# Patient Record
Sex: FEMALE | Race: WHITE | Employment: STUDENT | URBAN - METROPOLITAN AREA
[De-identification: names, ages, dates, MRNs, and addresses within clinical notes are randomized per-mention and may not be internally consistent; named-entity substitution may affect disease eponyms.]

---

## 2023-09-18 ENCOUNTER — HOSPITAL ENCOUNTER (OUTPATIENT)
Age: 23
Discharge: HOME | End: 2023-09-18
Payer: COMMERCIAL

## 2023-09-18 ENCOUNTER — OFFICE VISIT (OUTPATIENT)
Dept: ENT CLINIC | Age: 23
End: 2023-09-18
Payer: COMMERCIAL

## 2023-09-18 VITALS
SYSTOLIC BLOOD PRESSURE: 114 MMHG | WEIGHT: 100 LBS | OXYGEN SATURATION: 98 % | DIASTOLIC BLOOD PRESSURE: 74 MMHG | HEART RATE: 96 BPM | TEMPERATURE: 97.6 F

## 2023-09-18 DIAGNOSIS — J30.2 SEASONAL ALLERGIC RHINITIS, UNSPECIFIED TRIGGER: Primary | Chronic | ICD-10-CM

## 2023-09-18 DIAGNOSIS — J30.2 SEASONAL ALLERGIC RHINITIS, UNSPECIFIED TRIGGER: ICD-10-CM

## 2023-09-18 DIAGNOSIS — R09.82 POST-NASAL DRAINAGE: ICD-10-CM

## 2023-09-18 DIAGNOSIS — J32.9 RECURRENT SINUSITIS: Chronic | ICD-10-CM

## 2023-09-18 PROCEDURE — 99203 OFFICE O/P NEW LOW 30 MIN: CPT | Performed by: OTOLARYNGOLOGY

## 2023-09-18 PROCEDURE — 86003 ALLG SPEC IGE CRUDE XTRC EA: CPT

## 2023-09-18 PROCEDURE — 82785 ASSAY OF IGE: CPT

## 2023-09-18 RX ORDER — LORATADINE 10 MG/1
10 TABLET ORAL DAILY
COMMUNITY

## 2023-09-18 RX ORDER — TRAZODONE HYDROCHLORIDE 50 MG/1
25 TABLET ORAL NIGHTLY PRN
COMMUNITY
Start: 2023-09-12

## 2023-09-18 RX ORDER — OMEPRAZOLE 40 MG/1
40 CAPSULE, DELAYED RELEASE ORAL DAILY
COMMUNITY
Start: 2022-12-19

## 2023-09-18 RX ORDER — DULOXETIN HYDROCHLORIDE 60 MG/1
CAPSULE, DELAYED RELEASE ORAL
COMMUNITY
Start: 2023-09-04

## 2023-09-18 RX ORDER — NORETHINDRONE ACETATE AND ETHINYL ESTRADIOL AND FERROUS FUMARATE 1MG-20(24)
KIT ORAL
COMMUNITY
Start: 2021-06-14

## 2023-09-18 ASSESSMENT — ENCOUNTER SYMPTOMS
SORE THROAT: 0
RHINORRHEA: 0
SINUS PAIN: 0

## 2023-09-23 LAB
A ALTERNATA IGE QN: <0.1 KU/L (ref 0–0.34)
A FUMIGATUS IGE QN: <0.1 KU/L (ref 0–0.34)
AMER SYCAMORE IGE QN: <0.1 KU/L (ref 0–0.34)
BERMUDA GRASS IGE QN: 4.41 KU/L (ref 0–0.34)
BOXELDER IGE QN: 0.44 KU/L (ref 0–0.34)
C SPHAEROSPERMUM IGE QN: <0.1 KUL/L (ref 0–0.34)
CALIF WALNUT IGE QN: 5.61 KU/L (ref 0–0.34)
CAT DANDER IGE QN: 0.32 KU/L (ref 0–0.34)
CMN PIGWEED IGE QN: <0.1 KU/L (ref 0–0.34)
COMMON RAGWEED IGE QN: 0.15 KU/L (ref 0–0.34)
COTTONWOOD IGE QN: 0.11 KU/L (ref 0–0.34)
D FARINAE IGE QN: 12 KU/L (ref 0–0.34)
D PTERONYSS IGE QN: 8.87 KU/L (ref 0–0.34)
DOG DANDER IGE QN: 0.17 KU/L (ref 0–0.34)
IGE SERPL-ACNC: 210 IU/ML (ref 0–100)
M RACEMOSUS IGE QN: <0.1 KU/L (ref 0–0.34)
MOUSE EPITH IGE QN: <0.1 KU/L (ref 0–0.34)
P NOTATUM IGE QN: <0.1 KU/L (ref 0–0.34)
PECAN/HICK TREE IGE QN: 8.09 KU/L (ref 0–0.34)
RED CEDAR IGE QN: <0.1 KU/L (ref 0–0.34)
ROACH IGE QN: <0.1 KU/L (ref 0–0.34)
SALTWORT IGE QN: <0.1 KU/L (ref 0–0.34)
SHEEP SORREL IGE QN: <0.1 KU/L (ref 0–0.34)
SILVER BIRCH IGE QN: 0.44 KU/L (ref 0–0.34)
TIMOTHY IGE QN: 14.7 KU/L (ref 0–0.34)
WHITE ASH IGE QN: 0.38 KU/L (ref 0–0.34)
WHITE ELM IGE QN: 0.35 KU/L (ref 0–0.34)
WHITE MULBERRY IGE QN: <0.1 KU/L (ref 0–0.34)
WHITE OAK IGE QN: 1.07 KU/L (ref 0–0.34)

## 2023-09-29 ENCOUNTER — TELEPHONE (OUTPATIENT)
Dept: ENT CLINIC | Age: 23
End: 2023-09-29

## 2023-09-29 NOTE — TELEPHONE ENCOUNTER
Please call patient and advise that allergy testing was positive to multiple allergens. Please schedule an appointment asap to discuss, follow up and determine allergy management.

## 2023-10-03 ENCOUNTER — OFFICE VISIT (OUTPATIENT)
Dept: ENT CLINIC | Age: 23
End: 2023-10-03

## 2023-10-03 ENCOUNTER — TELEPHONE (OUTPATIENT)
Dept: ENT CLINIC | Age: 23
End: 2023-10-03

## 2023-10-03 VITALS
BODY MASS INDEX: 19.35 KG/M2 | HEART RATE: 96 BPM | WEIGHT: 96 LBS | OXYGEN SATURATION: 98 % | SYSTOLIC BLOOD PRESSURE: 104 MMHG | DIASTOLIC BLOOD PRESSURE: 71 MMHG | TEMPERATURE: 97.6 F | HEIGHT: 59 IN

## 2023-10-03 DIAGNOSIS — J32.9 CHRONIC SINUSITIS, UNSPECIFIED LOCATION: Chronic | ICD-10-CM

## 2023-10-03 DIAGNOSIS — J01.90 ACUTE RHINOSINUSITIS: Primary | ICD-10-CM

## 2023-10-03 RX ORDER — CEFDINIR 300 MG/1
600 CAPSULE ORAL DAILY
Qty: 28 CAPSULE | Refills: 0 | Status: SHIPPED | OUTPATIENT
Start: 2023-10-03 | End: 2023-10-17

## 2023-10-03 NOTE — TELEPHONE ENCOUNTER
Patients mother is calling stating that patient feels miserable with sinus headache and drainage. Patients mother stated that Dr. Merline Gibson told her to call and let us know that she needs to be seen the same day or the next day. I informed her that I can not over book appointments and gave her the first available on 10/9/2023 at 9:20 AM. Patients mother stated that she will be seeing if she can get her in some where else sooner because the patient is miserable.

## 2023-10-03 NOTE — PROGRESS NOTES
Chief Complaint   Patient presents with    Sinusitis        HISTORY OF PRESENT ILLNESS    Rivera Mcgill is a 23 y.o. female who presented today for evaluation and management for sinus infection. \"Started sinus headache Thursday. Post nasal drainage started Friday and progressively got worse. Face hurts really bad, above and below the eyes. My whole body started and aching on Sunday. PND makes nausea. My ear hurt a little bit. nasal pain. Typical symptoms of past sinus infections. This is the 5 sinus infection this year. Treated by the pediatrician. Patient was here with her mother. REVIEW OF SYSTEMS  Constitutional:  Denied fever and chills. ENT/sinus:  Denied otorrhea, rhinorrhea, and sore throat. EXAMINATION    WDWN, NAD  Voice:  Normal.    (+) Ears:  TMs non-erythematous, dull, mildly retracted, with probable ARIANE. The EACs, mastoids and pinnae were normal.    Nose:  Normal with no lesions.  (+)  Sinuses:  Tender x 4   (+)  Throat, OC/ OP:  moderate erythema of the post oropharyngeal wall with no pus. Otherwise, normal with no lesions. Neck:  NT, No masses. Trachea midline. Nodes:  No lymphadenopathy. IMPRESSION / Arlen Perez / ORDERS:   Ethan Hoffmann was seen today for sinusitis. Diagnoses and all orders for this visit:    Acute rhinosinusitis  -     cefdinir (OMNICEF) 300 MG capsule; Take 2 capsules by mouth daily for 14 days Take both capsules together at the same time, once daily. RECOMMENDATIONS / PLAN:   See Patient Instructions on file for this visit, which were discussed with the patient. Discussed FESS.

## 2023-10-03 NOTE — PATIENT INSTRUCTIONS
Please read and follow these instructions. Please call the office and speak to the MA or LPN with any questions regarding these instructions. RHINOSINUSITIS CARE  You may use acetaminophen (eg. Tylenol) or Ibuprofen (eg. Advil) (over the counter medications) as needed for fever or pain. Take Probiotic while you are taking antibiotics, to prevent diarrhea, stomach upset, pseudomembranous colitis, and C. difficile diarrhea. This may be obtained at your pharmacy or health food store. Alternatively, you may eat one cup of yogurt with active or live cultures twice daily while taking the antibiotic. Continue for two to three days after completion of the antibiotic. Use a 12 hour decongestant spray, 0.05% oxymetazoline (e.g. Afrin, Duration, 4-Way). Spray each nostril twice three times a day for three days, then two times a day for 2 days, and then stop for two days and then repeat the cycle once. Take one Mucinex-D (red orange box) maximum strength 1200 mg tablet each morning and one Mucinex (blue box) maximum strength 1200 mg tablet each evening, about 12 hours later, daily for the next 14 days. Use a saline nasal/sinus irrigation system, e.g. NeilMed sinus rinse, twice daily to help to clear secretions and drainage. Use distilled water; DO NOT USE TAP WATER! This is because of the possibility of amoeba or other microorganisms in tap water, which can result in a fatal disease. Alternatively, you could use a blue bulb syringe and solution of 1/4 tsp of table salt in 8 ounces (one cup or 240 ml) of distilled water. Use fluticasone 2 sprays in each nostril once daily for the next 14 days and then as needed for allergies. It is important to take all your medications as prescribed. Please continue your antibiotics as prescribed. Please call the office if your condition worsens or if symptoms persist after treatment is completed.

## 2023-10-30 SDOH — HEALTH STABILITY: PHYSICAL HEALTH: ON AVERAGE, HOW MANY MINUTES DO YOU ENGAGE IN EXERCISE AT THIS LEVEL?: 30 MIN

## 2023-10-30 SDOH — HEALTH STABILITY: PHYSICAL HEALTH: ON AVERAGE, HOW MANY DAYS PER WEEK DO YOU ENGAGE IN MODERATE TO STRENUOUS EXERCISE (LIKE A BRISK WALK)?: 2 DAYS

## 2023-11-01 ENCOUNTER — OFFICE VISIT (OUTPATIENT)
Dept: INTERNAL MEDICINE CLINIC | Age: 23
End: 2023-11-01
Payer: COMMERCIAL

## 2023-11-01 VITALS
BODY MASS INDEX: 19.96 KG/M2 | SYSTOLIC BLOOD PRESSURE: 116 MMHG | WEIGHT: 98.8 LBS | OXYGEN SATURATION: 99 % | HEART RATE: 85 BPM | DIASTOLIC BLOOD PRESSURE: 60 MMHG

## 2023-11-01 DIAGNOSIS — J30.2 SEASONAL ALLERGIC RHINITIS, UNSPECIFIED TRIGGER: ICD-10-CM

## 2023-11-01 DIAGNOSIS — F32.A ANXIETY AND DEPRESSION: ICD-10-CM

## 2023-11-01 DIAGNOSIS — G47.00 INSOMNIA, UNSPECIFIED TYPE: ICD-10-CM

## 2023-11-01 DIAGNOSIS — G43.019 INTRACTABLE MIGRAINE WITHOUT AURA AND WITHOUT STATUS MIGRAINOSUS: Primary | ICD-10-CM

## 2023-11-01 DIAGNOSIS — R48.0 DYSLEXIA: ICD-10-CM

## 2023-11-01 DIAGNOSIS — Z23 NEED FOR INFLUENZA VACCINATION: ICD-10-CM

## 2023-11-01 DIAGNOSIS — F41.9 ANXIETY AND DEPRESSION: ICD-10-CM

## 2023-11-01 PROBLEM — R42 VERTIGO: Status: ACTIVE | Noted: 2018-08-31

## 2023-11-01 PROBLEM — M25.562 CHRONIC PAIN OF BOTH KNEES: Status: RESOLVED | Noted: 2018-12-20 | Resolved: 2023-11-01

## 2023-11-01 PROBLEM — M79.18 AMPLIFIED MUSCULOSKELETAL PAIN SYNDROME: Status: ACTIVE | Noted: 2021-08-12

## 2023-11-01 PROBLEM — H53.8 BLURRED VISION, BILATERAL: Status: ACTIVE | Noted: 2019-08-21

## 2023-11-01 PROBLEM — M25.561 CHRONIC PAIN OF BOTH KNEES: Status: ACTIVE | Noted: 2018-12-20

## 2023-11-01 PROBLEM — M79.672 LEFT FOOT PAIN: Status: ACTIVE | Noted: 2021-03-25

## 2023-11-01 PROBLEM — G89.29 CHRONIC PAIN OF BOTH KNEES: Status: ACTIVE | Noted: 2018-12-20

## 2023-11-01 PROBLEM — M79.672 LEFT FOOT PAIN: Status: RESOLVED | Noted: 2021-03-25 | Resolved: 2023-11-01

## 2023-11-01 PROBLEM — H53.8 BLURRED VISION, BILATERAL: Status: RESOLVED | Noted: 2019-08-21 | Resolved: 2023-11-01

## 2023-11-01 PROBLEM — G89.4 AMPLIFIED MUSCULOSKELETAL PAIN SYNDROME: Status: ACTIVE | Noted: 2021-08-12

## 2023-11-01 PROBLEM — M25.561 CHRONIC PAIN OF BOTH KNEES: Status: RESOLVED | Noted: 2018-12-20 | Resolved: 2023-11-01

## 2023-11-01 PROBLEM — G89.29 CHRONIC MIDLINE LOW BACK PAIN: Status: RESOLVED | Noted: 2021-05-19 | Resolved: 2023-11-01

## 2023-11-01 PROBLEM — M76.891 HAMSTRING TENDONITIS OF RIGHT THIGH: Status: RESOLVED | Noted: 2019-04-29 | Resolved: 2023-11-01

## 2023-11-01 PROBLEM — M79.18 AMPLIFIED MUSCULOSKELETAL PAIN SYNDROME: Status: RESOLVED | Noted: 2021-08-12 | Resolved: 2023-11-01

## 2023-11-01 PROBLEM — M25.562 CHRONIC PAIN OF BOTH KNEES: Status: ACTIVE | Noted: 2018-12-20

## 2023-11-01 PROBLEM — R63.4 WEIGHT LOSS: Status: ACTIVE | Noted: 2019-08-21

## 2023-11-01 PROBLEM — M54.50 CHRONIC MIDLINE LOW BACK PAIN: Status: ACTIVE | Noted: 2021-05-19

## 2023-11-01 PROBLEM — M54.50 CHRONIC MIDLINE LOW BACK PAIN: Status: RESOLVED | Noted: 2021-05-19 | Resolved: 2023-11-01

## 2023-11-01 PROBLEM — R42 VERTIGO: Status: RESOLVED | Noted: 2018-08-31 | Resolved: 2023-11-01

## 2023-11-01 PROBLEM — G89.29 CHRONIC MIDLINE LOW BACK PAIN: Status: ACTIVE | Noted: 2021-05-19

## 2023-11-01 PROBLEM — R63.4 WEIGHT LOSS: Status: RESOLVED | Noted: 2019-08-21 | Resolved: 2023-11-01

## 2023-11-01 PROBLEM — M76.891 HAMSTRING TENDONITIS OF RIGHT THIGH: Status: ACTIVE | Noted: 2019-04-29

## 2023-11-01 PROBLEM — G89.29 CHRONIC PAIN OF BOTH KNEES: Status: RESOLVED | Noted: 2018-12-20 | Resolved: 2023-11-01

## 2023-11-01 PROBLEM — G89.4 AMPLIFIED MUSCULOSKELETAL PAIN SYNDROME: Status: RESOLVED | Noted: 2021-08-12 | Resolved: 2023-11-01

## 2023-11-01 PROBLEM — J30.9 ALLERGIC RHINITIS: Status: ACTIVE | Noted: 2023-11-01

## 2023-11-01 PROCEDURE — 90674 CCIIV4 VAC NO PRSV 0.5 ML IM: CPT | Performed by: NURSE PRACTITIONER

## 2023-11-01 PROCEDURE — 90471 IMMUNIZATION ADMIN: CPT | Performed by: NURSE PRACTITIONER

## 2023-11-01 PROCEDURE — 99204 OFFICE O/P NEW MOD 45 MIN: CPT | Performed by: NURSE PRACTITIONER

## 2023-11-01 SDOH — ECONOMIC STABILITY: FOOD INSECURITY: WITHIN THE PAST 12 MONTHS, THE FOOD YOU BOUGHT JUST DIDN'T LAST AND YOU DIDN'T HAVE MONEY TO GET MORE.: NEVER TRUE

## 2023-11-01 SDOH — ECONOMIC STABILITY: FOOD INSECURITY: WITHIN THE PAST 12 MONTHS, YOU WORRIED THAT YOUR FOOD WOULD RUN OUT BEFORE YOU GOT MONEY TO BUY MORE.: NEVER TRUE

## 2023-11-01 SDOH — ECONOMIC STABILITY: INCOME INSECURITY: HOW HARD IS IT FOR YOU TO PAY FOR THE VERY BASICS LIKE FOOD, HOUSING, MEDICAL CARE, AND HEATING?: NOT HARD AT ALL

## 2023-11-01 SDOH — ECONOMIC STABILITY: HOUSING INSECURITY
IN THE LAST 12 MONTHS, WAS THERE A TIME WHEN YOU DID NOT HAVE A STEADY PLACE TO SLEEP OR SLEPT IN A SHELTER (INCLUDING NOW)?: NO

## 2023-11-01 ASSESSMENT — ENCOUNTER SYMPTOMS
SHORTNESS OF BREATH: 0
CHEST TIGHTNESS: 0
COUGH: 0
WHEEZING: 0

## 2023-11-01 ASSESSMENT — PATIENT HEALTH QUESTIONNAIRE - PHQ9
2. FEELING DOWN, DEPRESSED OR HOPELESS: 0
3. TROUBLE FALLING OR STAYING ASLEEP: 0
SUM OF ALL RESPONSES TO PHQ QUESTIONS 1-9: 0
6. FEELING BAD ABOUT YOURSELF - OR THAT YOU ARE A FAILURE OR HAVE LET YOURSELF OR YOUR FAMILY DOWN: 0
4. FEELING TIRED OR HAVING LITTLE ENERGY: 0
7. TROUBLE CONCENTRATING ON THINGS, SUCH AS READING THE NEWSPAPER OR WATCHING TELEVISION: 0
8. MOVING OR SPEAKING SO SLOWLY THAT OTHER PEOPLE COULD HAVE NOTICED. OR THE OPPOSITE, BEING SO FIGETY OR RESTLESS THAT YOU HAVE BEEN MOVING AROUND A LOT MORE THAN USUAL: 0
SUM OF ALL RESPONSES TO PHQ9 QUESTIONS 1 & 2: 0
SUM OF ALL RESPONSES TO PHQ QUESTIONS 1-9: 0
1. LITTLE INTEREST OR PLEASURE IN DOING THINGS: 0
5. POOR APPETITE OR OVEREATING: 0
9. THOUGHTS THAT YOU WOULD BE BETTER OFF DEAD, OR OF HURTING YOURSELF: 0
10. IF YOU CHECKED OFF ANY PROBLEMS, HOW DIFFICULT HAVE THESE PROBLEMS MADE IT FOR YOU TO DO YOUR WORK, TAKE CARE OF THINGS AT HOME, OR GET ALONG WITH OTHER PEOPLE: 0

## 2023-11-01 NOTE — ASSESSMENT & PLAN NOTE
Chronic, intermittent. Continue loratadine and flonase during trigger seasons. Continue seeing ENT as recommended.

## 2023-11-01 NOTE — ASSESSMENT & PLAN NOTE
Chronic, controlled. Continue trazodone 25 mg nightly prn. Continue seeing psychiatry and psychology.

## 2023-11-01 NOTE — PROGRESS NOTES
11/1/2023    This is a 23 y.o. female   Chief Complaint   Patient presents with    New Patient     Previous PCP was [facility] [place]. Dr. Jose Cervantes. No concerns      HPI     Here to establish care, previously seeing pediatrician. Seeing GYN, Dr. Letha Arceo- on OCP  History of anemia that is controlled. Had lab work last year, reviewed in care everywhere. Seeing psychiatry at [facility] and counseling at [facility]. Anxiety and depression - taking duloxetine 60 mg daily. Insomnia - doing well on trazodone 25 mg nightly as needed. 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 Niece is 4. Lives at home with mom/ dad. Graduated from  high school. In school at [address] but changing to PT. History of vertigo and blurred vision - resolved with PT   Migraines - takes prn ibuprofen and have been controlled. She sees Dr. Nick Prasad with ENT for recurrent sinusitis, currently controlled and doing well on antihistamine and flonase. EGD x2 - has not seen GI recently, denies any concerns and doing well on PPI. Adopted at age 5, family history is unknown. 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 Blurred vision, bilateral 8/21/2019    Chronic midline low back pain 5/19/2021    Chronic pain of both knees 12/20/2018    Depression 2019?     Fracture of great toe, left, closed 11/29/2013    Hamstring tendonitis of right thigh 4/29/2019    Left foot pain 3/25/2021    MTP instability 11/19/2013    Patellofemoral disorder 1/4/2013    Quadriceps tendonitis 1/4/2013    Vertigo 8/31/2018    Weight loss 8/21/2019       Past Surgical History:   Procedure Laterality Date    KNEE SURGERY Right 01/2020    UPPER GASTROINTESTINAL ENDOSCOPY  11/22/2022    UPPER GASTROINTESTINAL ENDOSCOPY

## 2023-11-04 ENCOUNTER — TELEPHONE (OUTPATIENT)
Dept: ENT CLINIC | Age: 23
End: 2023-11-04

## 2023-11-06 NOTE — TELEPHONE ENCOUNTER
Pt called back and would like to see when she can be scheduled for a allergy f/u and a sinus infection.

## 2023-11-08 ENCOUNTER — OFFICE VISIT (OUTPATIENT)
Dept: INTERNAL MEDICINE CLINIC | Age: 23
End: 2023-11-08
Payer: COMMERCIAL

## 2023-11-08 VITALS
SYSTOLIC BLOOD PRESSURE: 114 MMHG | HEART RATE: 90 BPM | WEIGHT: 98 LBS | DIASTOLIC BLOOD PRESSURE: 70 MMHG | OXYGEN SATURATION: 97 % | BODY MASS INDEX: 19.79 KG/M2

## 2023-11-08 DIAGNOSIS — R09.81 SINUS CONGESTION: ICD-10-CM

## 2023-11-08 DIAGNOSIS — R68.89 FLU-LIKE SYMPTOMS: Primary | ICD-10-CM

## 2023-11-08 DIAGNOSIS — J02.9 SORE THROAT: ICD-10-CM

## 2023-11-08 LAB
INFLUENZA A ANTIBODY: NORMAL
INFLUENZA B ANTIBODY: NORMAL
RSV AG NOSE QL: NEGATIVE
S PYO AG THROAT QL: NORMAL

## 2023-11-08 PROCEDURE — 99213 OFFICE O/P EST LOW 20 MIN: CPT | Performed by: NURSE PRACTITIONER

## 2023-11-08 PROCEDURE — 87804 INFLUENZA ASSAY W/OPTIC: CPT | Performed by: NURSE PRACTITIONER

## 2023-11-08 PROCEDURE — 87880 STREP A ASSAY W/OPTIC: CPT | Performed by: NURSE PRACTITIONER

## 2023-11-08 NOTE — ASSESSMENT & PLAN NOTE
Acute, uncontrolled. Flu and strep are negative on POCT. Checking RSV and COVID.  No signs of bacterial infection on exam.     Supportive care reviewed:  Humidified air  Honey lemon tea  Nasal rinse prn  Flonase daily  NSAIDs/ tylenol for pain and fever  Mucinex prn for congestion    transmission precautions reviewed   Check pulse ox and go to ED/ urgent care if drops below 92% and/ or uncontrolled worsening dyspnea

## 2023-11-08 NOTE — PROGRESS NOTES
11/8/23     Chief Complaint   Patient presents with    Congestion     Congestion, facial sinus pain, slight sore throat, raspy voice- started Sunday. Neg Covid test today. HPI    Here for an acute visit, reports a 3 day history of congestion, sinus drainage, sinus pressure, sore throat, hoarseness, fatigue. Denies fever, chills, body aches, cough, SOB. 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 Symptoms are unchanged other than hoarseness is new. She is using advil cold / sinus with some relief. Using her flonase daily. Last treated with abx last month with ENT. No Known Allergies    Current Outpatient Medications   Medication Sig Dispense Refill    loratadine (CLARITIN) 10 MG tablet Take 1 tablet by mouth daily      DULoxetine (CYMBALTA) 60 MG extended release capsule       Norethin Ace-Eth Estrad-FE 1-20 MG-MCG(24) CHEW CHEW AND SWALLOW 1 TABLET BY MOUTH EVERY DAY      traZODone (DESYREL) 50 MG tablet Take 0.5 tablets by mouth nightly as needed      omeprazole (PRILOSEC) 40 MG delayed release capsule Take 1 capsule by mouth daily       No current facility-administered medications for this visit. Review of Systems  Negative other than HPI    Vitals:    11/08/23 1308   BP: 114/70   Pulse: 90   SpO2: 97%   Weight: 44.5 kg (98 lb)      Physical Exam  Constitutional:       General: She is not in acute distress. Appearance: Normal appearance. HENT:      Head: Normocephalic and atraumatic. Right Ear: Ear canal normal. Tympanic membrane is scarred. Tympanic membrane is not perforated, erythematous or retracted. Left Ear: Ear canal normal. Tympanic membrane is scarred. Tympanic membrane is not perforated, erythematous or retracted. Ears:      Comments: Bilateral TM dull      Nose: No congestion. Right Sinus: No maxillary sinus tenderness or frontal sinus tenderness. Left Sinus: No maxillary sinus tenderness or frontal sinus tenderness.

## 2023-11-09 LAB — SARS-COV-2 RNA RESP QL NAA+PROBE: NOT DETECTED

## 2023-11-10 ENCOUNTER — OFFICE VISIT (OUTPATIENT)
Dept: INTERNAL MEDICINE CLINIC | Age: 23
End: 2023-11-10
Payer: COMMERCIAL

## 2023-11-10 VITALS — DIASTOLIC BLOOD PRESSURE: 60 MMHG | OXYGEN SATURATION: 98 % | HEART RATE: 62 BPM | SYSTOLIC BLOOD PRESSURE: 110 MMHG

## 2023-11-10 DIAGNOSIS — H65.03 NON-RECURRENT ACUTE SEROUS OTITIS MEDIA OF BOTH EARS: ICD-10-CM

## 2023-11-10 DIAGNOSIS — J40 BRONCHITIS: ICD-10-CM

## 2023-11-10 DIAGNOSIS — J06.9 ACUTE URI OF MULTIPLE SITES: Primary | ICD-10-CM

## 2023-11-10 PROBLEM — R68.89 FLU-LIKE SYMPTOMS: Status: RESOLVED | Noted: 2023-11-08 | Resolved: 2023-11-10

## 2023-11-10 PROCEDURE — 99213 OFFICE O/P EST LOW 20 MIN: CPT | Performed by: NURSE PRACTITIONER

## 2023-11-10 RX ORDER — AMOXICILLIN AND CLAVULANATE POTASSIUM 875; 125 MG/1; MG/1
1 TABLET, FILM COATED ORAL 2 TIMES DAILY
Qty: 14 TABLET | Refills: 0 | Status: SHIPPED | OUTPATIENT
Start: 2023-11-10 | End: 2023-11-17

## 2023-11-10 RX ORDER — METHYLPREDNISOLONE 4 MG/1
TABLET ORAL
Qty: 1 KIT | Refills: 0 | Status: SHIPPED | OUTPATIENT
Start: 2023-11-10

## 2023-11-10 RX ORDER — BENZONATATE 100 MG/1
100 CAPSULE ORAL 3 TIMES DAILY PRN
Qty: 30 CAPSULE | Refills: 0 | Status: SHIPPED | OUTPATIENT
Start: 2023-11-10 | End: 2023-11-20

## 2023-11-10 NOTE — PROGRESS NOTES
11/10/23     Chief Complaint   Patient presents with    Oral Swelling     Throat pain and feels swollen/tight       HPI    Here for an acute visit/ follow up. She was seen here 2 days ago for URI symptoms- she continues to have congestion, sinus drainage, sinus pressure, sore throat, hoarseness, fatigue. She had negative covid, flu, strep and rsv tests. She is using mucinex, flonase, hydrating and supportive care without relief. Today is she is feeling worse, complains of new SOB, tightness, sore throat, bilateral ear pain and low grade fevers the past 2 days. No Known Allergies    Current Outpatient Medications   Medication Sig Dispense Refill    amoxicillin-clavulanate (AUGMENTIN) 875-125 MG per tablet Take 1 tablet by mouth 2 times daily for 7 days 14 tablet 0    methylPREDNISolone (MEDROL DOSEPACK) 4 MG tablet Take by mouth. 1 kit 0    benzonatate (TESSALON) 100 MG capsule Take 1 capsule by mouth 3 times daily as needed for Cough 30 capsule 0    loratadine (CLARITIN) 10 MG tablet Take 1 tablet by mouth daily      DULoxetine (CYMBALTA) 60 MG extended release capsule       Norethin Ace-Eth Estrad-FE 1-20 MG-MCG(24) CHEW CHEW AND SWALLOW 1 TABLET BY MOUTH EVERY DAY      traZODone (DESYREL) 50 MG tablet Take 0.5 tablets by mouth nightly as needed      omeprazole (PRILOSEC) 40 MG delayed release capsule Take 1 capsule by mouth daily       No current facility-administered medications for this visit. Review of Systems  Negative other than HPI     Vitals:    11/10/23 1110   BP: 110/60   Pulse: 62   SpO2: 98%      Physical Exam  Constitutional:       General: She is not in acute distress. Appearance: Normal appearance. She is not ill-appearing. HENT:      Head: Normocephalic and atraumatic. Right Ear: A middle ear effusion is present. Tympanic membrane is scarred, erythematous and bulging. Tympanic membrane is not perforated. Left Ear: A middle ear effusion is present.  Tympanic membrane is

## 2023-11-10 NOTE — ASSESSMENT & PLAN NOTE
Acute, worsening. Covering with abx given worsening symptoms and new otitis media. Covering wheezing with steroids.       Supportive care reviewed  Humidified air  Honey lemon tea  Nasal rinse prn  Flonase daily  NSAIDs/ tylenol for pain and fever  Mucinex prn for congestion    transmission precautions reviewed   Check pulse ox and go to ED/ urgent care if drops below 92% and/ or uncontrolled worsening dyspnea

## 2023-12-15 ENCOUNTER — TELEMEDICINE (OUTPATIENT)
Dept: INTERNAL MEDICINE CLINIC | Age: 23
End: 2023-12-15
Payer: COMMERCIAL

## 2023-12-15 DIAGNOSIS — J06.9 ACUTE URI OF MULTIPLE SITES: Primary | ICD-10-CM

## 2023-12-15 LAB
INFLUENZA A ANTIBODY: NORMAL
INFLUENZA B ANTIBODY: NORMAL
S PYO AG THROAT QL: NORMAL

## 2023-12-15 PROCEDURE — 99214 OFFICE O/P EST MOD 30 MIN: CPT | Performed by: NURSE PRACTITIONER

## 2023-12-15 PROCEDURE — 87880 STREP A ASSAY W/OPTIC: CPT | Performed by: NURSE PRACTITIONER

## 2023-12-15 PROCEDURE — 87804 INFLUENZA ASSAY W/OPTIC: CPT | Performed by: NURSE PRACTITIONER

## 2023-12-15 RX ORDER — ALBUTEROL SULFATE 90 UG/1
2 AEROSOL, METERED RESPIRATORY (INHALATION) 4 TIMES DAILY PRN
Qty: 18 G | Refills: 0 | Status: SHIPPED | OUTPATIENT
Start: 2023-12-15

## 2023-12-15 NOTE — PROGRESS NOTES
12/15/2023    TELEHEALTH EVALUATION -- Audio/Visual (During JOPGI-69 public health emergency)    Chief Complaint   Patient presents with    Congestion     HPI:    Arcelia Wu (:  2000) has requested an audio/video evaluation for the following concern(s):    VV for URI symptoms, started 2-3 days ago. Having sinus pressure, sore throat, PND, congestion, mild cough, fatigue and bilateral ear pain. Denies fever, chills, body aches. Denies any known exposures to flu, strep, covid or rsv. She is using OTC cold/ sinus, with some relief. She is not using nasal sprays. She sees ENT, does not have appt scheduled. Reports negative home covid test today. Review of Systems  Negative other than HPI     Prior to Visit Medications    Medication Sig Taking?  Authorizing Provider   albuterol sulfate HFA (VENTOLIN HFA) 108 (90 Base) MCG/ACT inhaler Inhale 2 puffs into the lungs 4 times daily as needed for Wheezing Yes Doug Catherine, APRN - CNP   loratadine (CLARITIN) 10 MG tablet Take 1 tablet by mouth daily  ProviderDominick MD   DULoxetine (CYMBALTA) 60 MG extended release capsule   ProviderDominick MD   Norethin Ace-Eth Estrad-FE 1-20 MG-MCG(24) CHEW CHEW AND SWALLOW 1 [address]  ProviderDominick MD   traZODone (DESYREL) 50 MG tablet Take 0.5 tablets by mouth nightly as needed  ProviderDominick MD   omeprazole (PRILOSEC) 40 MG delayed release capsule Take 1 capsule by mouth daily  ProviderDominick MD     Social History     Tobacco Use    Smoking status: Never     Passive exposure: Never    Smokeless tobacco: Never   Vaping Use    Vaping Use: Never used   Substance Use Topics    Alcohol use: Never    Drug use: Never            PHYSICAL EXAMINATION:  [ INSTRUCTIONS:  \"[x]\" Indicates a positive item  \"[]\" Indicates a negative item  -- DELETE ALL ITEMS NOT EXAMINED]  Vital Signs: (As obtained by patient/caregiver or practitioner observation)        12/15/2023    12:56

## 2023-12-15 NOTE — ASSESSMENT & PLAN NOTE
Acute, uncontrolled. Recurrent sinusitis. Follow up with ENT. Come to office for covid, flu, rsv and strep testing. Sending over a Rx for albuterol to use if needed with history of wheezing.      Supportive care reviewed  Humidified air  Honey lemon tea  Nasal rinse prn  Flonase daily  NSAIDs/ tylenol for pain and fever  Mucinex prn for congestion    transmission precautions reviewed   Check pulse ox and go to ED/ urgent care if drops below 92% and/ or uncontrolled worsening dyspnea

## 2023-12-16 LAB — RSV AG NOSE QL: NEGATIVE

## 2023-12-17 LAB — SARS-COV-2 N GENE RESP QL NAA+PROBE: NOT DETECTED

## 2023-12-21 PROBLEM — J01.01 RECURRENT MAXILLARY SINUSITIS: Status: ACTIVE | Noted: 2023-12-21

## 2024-01-22 ENCOUNTER — OFFICE VISIT (OUTPATIENT)
Dept: ENT CLINIC | Age: 24
End: 2024-01-22

## 2024-01-22 VITALS
HEART RATE: 80 BPM | HEIGHT: 59 IN | TEMPERATURE: 97.8 F | WEIGHT: 100 LBS | SYSTOLIC BLOOD PRESSURE: 105 MMHG | BODY MASS INDEX: 20.16 KG/M2 | OXYGEN SATURATION: 99 % | DIASTOLIC BLOOD PRESSURE: 72 MMHG

## 2024-01-22 DIAGNOSIS — J01.90 ACUTE RHINOSINUSITIS: Primary | ICD-10-CM

## 2024-01-22 DIAGNOSIS — J30.2 SEASONAL ALLERGIC RHINITIS, UNSPECIFIED TRIGGER: Chronic | ICD-10-CM

## 2024-01-22 DIAGNOSIS — R04.0 EPISTAXIS: ICD-10-CM

## 2024-01-22 DIAGNOSIS — J32.9 CHRONIC SINUSITIS, UNSPECIFIED LOCATION: Chronic | ICD-10-CM

## 2024-01-22 NOTE — PROGRESS NOTES
[facility]   DIVISION OF OTOLARYNGOLOGY- HEAD & NECK SURGERY  [facility]          PCP:  Valeria Catherine, APRN - CNP      CHIEF COMPLAINT    Chief Complaint   Patient presents with    Sinusitis        HISTORY OF PRESENT ILLNESS       Marley Chu is a 23 y.o. female who was here for recheck and follow up of sinusitis.  Today I feel a little pressure in the sinuses [cheek and forehead].  Feel sore in neck under ears and angle of mandibile bilaterally. Feels like her typical early sinus infection symptoms.  Sinus infection in December treated by by HCP at PCP office, with a different antibiotic, made me sick, took it until last day of the course finished on [place] evening and better the next day.  Takes Wal-itin (generic Zyrtec) for allergies.  Right nosebleed every 1- 2 months, for 10 - 20 minutes, \"I just pinch it with a tissue.\"      REVIEW OF SYSTEMS   Review of Systems   Constitutional:  Negative for chills and fever.   HENT:  Positive for sinus pressure (maxilla, for one day) and sinus pain (bifrontal for one day). Negative for congestion, ear discharge, ear pain, rhinorrhea and sore throat.          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    Blurred vision, bilateral 8/21/2019    Chronic midline low back pain 5/19/2021    Chronic pain of both knees 12/20/2018    Depression 2019?    Fracture of great toe, left, closed 11/29/2013    Hamstring tendonitis of right thigh 4/29/2019    Left foot pain 3/25/2021    MTP instability 11/19/2013    Patellofemoral disorder 1/4/2013    Quadriceps tendonitis 1/4/2013    Vertigo 8/31/2018    Weight loss 8/21/2019       Past Surgical History:   Procedure Laterality Date    KNEE SURGERY Right 01/2020    UPPER GASTROINTESTINAL ENDOSCOPY  11/22/2022    UPPER GASTROINTESTINAL ENDOSCOPY      unsure of date         Current Outpatient

## 2024-01-22 NOTE — PATIENT INSTRUCTIONS
Please read and follow these instructions.  Please call the office and speak to the MA or LPN with any questions regarding these instructions.        RHINOSINUSITIS CARE  You may use acetaminophen (eg. Tylenol) or Ibuprofen (eg. Advil) (over the counter medications) as needed for fever or pain.  Use a 12 hour decongestant spray, 0.05% oxymetazoline (e.g. Afrin, Duration, 4-Way).  Spray each nostril twice three times a day for three days, then two times a day for 2 days, and then stop for two days and then repeat the cycle once.  Take one Mucinex-D (red orange box) maximum strength 1200 mg tablet each morning and one Mucinex (blue box) maximum strength 1200 mg tablet each evening, about 12 hours later, daily for the next 14 days.     Use a saline nasal/sinus irrigation system, e.g. NeilMed sinus rinse, twice daily to help to clear secretions and drainage.  Use distilled water; DO NOT USE TAP WATER!  This is because of the possibility of amoeba or other microorganisms in tap water, which can result in a fatal disease.  Alternatively, you could use a blue bulb syringe and solution of 1/4 tsp of table salt in 8 ounces (one cup or 240 ml) of distilled water.    Use fluticasone 2 sprays in each nostril once daily for the next 14 days and then as needed for allergies.  It is important to take all your medications as prescribed.  Please continue your antibiotics as prescribed.    Please call the office if your condition worsens or if symptoms persist in 7 days..          ===================================================================      ADVERSE AND SIDE EFFECTS OF MEDICATIONS:    Please be aware of the following possible adverse reactions, side effects, and complications of the following medications, including, but not limited to: allergic reaction, interactions with other medications, nausea, headache, diarrhea, persistent symptoms, failure to improve, and the following:     General side effects of antibiotic:

## 2024-01-23 ENCOUNTER — PATIENT MESSAGE (OUTPATIENT)
Dept: INTERNAL MEDICINE CLINIC | Age: 24
End: 2024-01-23

## 2024-01-23 NOTE — TELEPHONE ENCOUNTER
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 In order to do so I have to have a TB test done with negative results within 90 days of my job shadowing! I also have to have proof of 2 MMRs, proof of chicken pox vaccine, and proof of hepatitis B vaccine. I am not sure if you have some of these documents of If I need to reach out to my pediartic doctor! Please let me know if I am able to get the TB test and these documents!    Best,  Marley Chu

## 2024-01-30 ENCOUNTER — OFFICE VISIT (OUTPATIENT)
Dept: INTERNAL MEDICINE CLINIC | Age: 24
End: 2024-01-30
Payer: COMMERCIAL

## 2024-01-30 VITALS
OXYGEN SATURATION: 98 % | WEIGHT: 97.6 LBS | SYSTOLIC BLOOD PRESSURE: 100 MMHG | TEMPERATURE: 98 F | DIASTOLIC BLOOD PRESSURE: 70 MMHG | HEIGHT: 59 IN | BODY MASS INDEX: 19.68 KG/M2 | HEART RATE: 103 BPM

## 2024-01-30 DIAGNOSIS — J06.9 VIRAL UPPER RESPIRATORY TRACT INFECTION: Primary | ICD-10-CM

## 2024-01-30 DIAGNOSIS — J30.2 SEASONAL ALLERGIC RHINITIS, UNSPECIFIED TRIGGER: ICD-10-CM

## 2024-01-30 DIAGNOSIS — R68.89 FLU-LIKE SYMPTOMS: ICD-10-CM

## 2024-01-30 LAB
INFLUENZA A ANTIBODY: NEGATIVE
INFLUENZA B ANTIBODY: NEGATIVE
S PYO AG THROAT QL: NORMAL

## 2024-01-30 PROCEDURE — 87804 INFLUENZA ASSAY W/OPTIC: CPT | Performed by: NURSE PRACTITIONER

## 2024-01-30 PROCEDURE — 99213 OFFICE O/P EST LOW 20 MIN: CPT | Performed by: NURSE PRACTITIONER

## 2024-01-30 PROCEDURE — 87880 STREP A ASSAY W/OPTIC: CPT | Performed by: NURSE PRACTITIONER

## 2024-01-30 RX ORDER — MONTELUKAST SODIUM 10 MG/1
10 TABLET ORAL DAILY
Qty: 90 TABLET | Refills: 1 | Status: SHIPPED | OUTPATIENT
Start: 2024-01-30

## 2024-01-30 ASSESSMENT — PATIENT HEALTH QUESTIONNAIRE - PHQ9
9. THOUGHTS THAT YOU WOULD BE BETTER OFF DEAD, OR OF HURTING YOURSELF: 0
1. LITTLE INTEREST OR PLEASURE IN DOING THINGS: NOT AT ALL
5. POOR APPETITE OR OVEREATING: 1
3. TROUBLE FALLING OR STAYING ASLEEP: SEVERAL DAYS
4. FEELING TIRED OR HAVING LITTLE ENERGY: NEARLY EVERY DAY
SUM OF ALL RESPONSES TO PHQ QUESTIONS 1-9: 6
2. FEELING DOWN, DEPRESSED OR HOPELESS: NOT AT ALL
6. FEELING BAD ABOUT YOURSELF - OR THAT YOU ARE A FAILURE OR HAVE LET YOURSELF OR YOUR FAMILY DOWN: NOT AT ALL
SUM OF ALL RESPONSES TO PHQ QUESTIONS 1-9: 6
7. TROUBLE CONCENTRATING ON THINGS, SUCH AS READING THE NEWSPAPER OR WATCHING TELEVISION: 1
10. IF YOU CHECKED OFF ANY PROBLEMS, HOW DIFFICULT HAVE THESE PROBLEMS MADE IT FOR YOU TO DO YOUR WORK, TAKE CARE OF THINGS AT HOME, OR GET ALONG WITH OTHER PEOPLE: 1
5. POOR APPETITE OR OVEREATING: SEVERAL DAYS
SUM OF ALL RESPONSES TO PHQ QUESTIONS 1-9: 6
SUM OF ALL RESPONSES TO PHQ9 QUESTIONS 1 & 2: 0
9. THOUGHTS THAT YOU WOULD BE BETTER OFF DEAD, OR OF HURTING YOURSELF: NOT AT ALL
7. TROUBLE CONCENTRATING ON THINGS, SUCH AS READING THE NEWSPAPER OR WATCHING TELEVISION: SEVERAL DAYS
10. IF YOU CHECKED OFF ANY PROBLEMS, HOW DIFFICULT HAVE THESE PROBLEMS MADE IT FOR YOU TO DO YOUR WORK, TAKE CARE OF THINGS AT HOME, OR GET ALONG WITH OTHER PEOPLE: SOMEWHAT DIFFICULT
6. FEELING BAD ABOUT YOURSELF - OR THAT YOU ARE A FAILURE OR HAVE LET YOURSELF OR YOUR FAMILY DOWN: 0
8. MOVING OR SPEAKING SO SLOWLY THAT OTHER PEOPLE COULD HAVE NOTICED. OR THE OPPOSITE, BEING SO FIGETY OR RESTLESS THAT YOU HAVE BEEN MOVING AROUND A LOT MORE THAN USUAL: 0
8. MOVING OR SPEAKING SO SLOWLY THAT OTHER PEOPLE COULD HAVE NOTICED. OR THE OPPOSITE - BEING SO FIDGETY OR RESTLESS THAT YOU HAVE BEEN MOVING AROUND A LOT MORE THAN USUAL: NOT AT ALL
4. FEELING TIRED OR HAVING LITTLE ENERGY: 3
SUM OF ALL RESPONSES TO PHQ QUESTIONS 1-9: 6
2. FEELING DOWN, DEPRESSED OR HOPELESS: 0
SUM OF ALL RESPONSES TO PHQ QUESTIONS 1-9: 6
3. TROUBLE FALLING OR STAYING ASLEEP: 1
1. LITTLE INTEREST OR PLEASURE IN DOING THINGS: 0

## 2024-01-30 NOTE — ASSESSMENT & PLAN NOTE
Acute, uncontrolled.  Flu and strep negative. Checking covid PCR. Use albuterol every 6 hrs while awake and ill.   Supportive care reviewed  Humidified air  Honey lemon tea  Nasal rinse prn  Flonase daily  NSAIDs/ tylenol for pain and fever  Mucinex prn for congestion    transmission precautions reviewed   Check pulse ox and go to ED/ urgent care if drops below 92% and/ or uncontrolled worsening dyspnea

## 2024-01-30 NOTE — ASSESSMENT & PLAN NOTE
Chronic, intermittent. Continue loratadine and flonase during trigger seasons. Starting singulair for better control. Discussed seein allergist given recurrent symptoms and allergy IgE results. Continue seeing ENT as recommended.

## 2024-01-30 NOTE — PROGRESS NOTES
membrane is erythematous. Tympanic membrane is not injected, scarred or perforated.      Nose:      Right Sinus: No maxillary sinus tenderness.      Left Sinus: No maxillary sinus tenderness.      Mouth/Throat:      Pharynx: Oropharynx is clear. Posterior oropharyngeal erythema present. No oropharyngeal exudate.   Cardiovascular:      Rate and Rhythm: Normal rate and regular rhythm.   Pulmonary:      Effort: Pulmonary effort is normal. No respiratory distress.   Neurological:      Mental Status: She is alert and oriented to person, place, and time. Mental status is at baseline.   Psychiatric:         Mood and Affect: Mood normal.         Behavior: Behavior normal.       Assessment/Plan:  1. Viral upper respiratory tract infection  Assessment & Plan:  Acute, uncontrolled.  Flu and strep negative. Checking covid PCR. Use albuterol every 6 hrs while awake and ill.   Supportive care reviewed  Humidified air  Honey lemon tea  Nasal rinse prn  Flonase daily  NSAIDs/ tylenol for pain and fever  Mucinex prn for congestion    transmission precautions reviewed   Check pulse ox and go to ED/ urgent care if drops below 92% and/ or uncontrolled worsening dyspnea     Orders:  -     COVID-19; Future  -     POCT Influenza A/B  -     POCT rapid strep A  2. Flu-like symptoms  3. Seasonal allergic rhinitis, unspecified trigger  Assessment & Plan:  Chronic, intermittent. Continue loratadine and flonase during trigger seasons. Starting singulair for better control. Discussed seein allergist given recurrent symptoms and allergy IgE results. Continue seeing ENT as recommended.   Orders:  -     montelukast (SINGULAIR) 10 MG tablet; Take 1 tablet by mouth daily, Disp-90 tablet, R-1Normal     Discussed medications with patient, who voiced understanding of their use and indications. All questions answered.    Return if symptoms worsen or fail to improve.      Electronically signed by KULWINDER Logan CNP on 1/30/2024 at 2:16 PM

## 2024-01-31 LAB — SARS-COV-2 RNA RESP QL NAA+PROBE: NOT DETECTED

## 2024-02-12 ENCOUNTER — PATIENT MESSAGE (OUTPATIENT)
Dept: INTERNAL MEDICINE CLINIC | Age: 24
End: 2024-02-12

## 2024-02-12 NOTE — TELEPHONE ENCOUNTER
From: Marley Chu  To: Valeria Catherine  Sent: 2/12/2024 3:13 PM EST  Subject: Blood testing    Good Afternoon,  Last time I was in Vlaeria said to come in when I wasnt sick to get bloodwork and so I have scheduled a appointment to talk with her about that on Thursday. Does this work or is there a different time I should come in?  Thank you!

## 2024-02-15 ENCOUNTER — OFFICE VISIT (OUTPATIENT)
Dept: INTERNAL MEDICINE CLINIC | Age: 24
End: 2024-02-15
Payer: COMMERCIAL

## 2024-02-15 VITALS
SYSTOLIC BLOOD PRESSURE: 98 MMHG | HEART RATE: 90 BPM | HEIGHT: 59 IN | DIASTOLIC BLOOD PRESSURE: 70 MMHG | WEIGHT: 99 LBS | BODY MASS INDEX: 19.96 KG/M2 | OXYGEN SATURATION: 98 %

## 2024-02-15 DIAGNOSIS — J30.2 SEASONAL ALLERGIC RHINITIS, UNSPECIFIED TRIGGER: Primary | ICD-10-CM

## 2024-02-15 DIAGNOSIS — F41.9 ANXIETY AND DEPRESSION: ICD-10-CM

## 2024-02-15 DIAGNOSIS — F32.A ANXIETY AND DEPRESSION: ICD-10-CM

## 2024-02-15 DIAGNOSIS — J01.01 RECURRENT MAXILLARY SINUSITIS: ICD-10-CM

## 2024-02-15 PROCEDURE — 99214 OFFICE O/P EST MOD 30 MIN: CPT | Performed by: NURSE PRACTITIONER

## 2024-02-15 RX ORDER — AZELASTINE 1 MG/ML
2 SPRAY, METERED NASAL 2 TIMES DAILY
Qty: 120 ML | Refills: 1 | Status: SHIPPED | OUTPATIENT
Start: 2024-02-15

## 2024-02-15 NOTE — ASSESSMENT & PLAN NOTE
Chronic, intermittent. Continue loratadine, montelukast, and Flonase. Start albuterol 4 times a day while awake and ill. Start azelastine nasal spray twice daily. Referral for allergist supplied recurrent symptoms and allergy IgE results. Continue seeing ENT as recommended.

## 2024-02-15 NOTE — PROGRESS NOTES
2/15/24     Chief Complaint   Patient presents with    Follow-up     labs    Sinus Problem     x2d    Otalgia    Chest Congestion     HPI    Here for an acute visit    Ear pain, sinus pain, jaw pain, chest congestion, congestion, green mucous for 2 days. No fevers, vomiting, muscle aches/pain, cough, sick contacts. Says this illness feels similar to previous rhinosinusitis. Reports her rhinosinusitis tends to get worse if not treated right away. Has been using Flonase. Has not used albuterol. She is taking Singulair and Claritin. She says she has tried Mucinex in the past but makes her ears hurt.     Recurrent rhinosinusitis - sees Dr. Ghotra with ENT last on 1/22 who diagnosed her with rhino sinusitis and recommended FESS and antihistamines and Flonase    Seen 1/30 here, was started on Singulair. She thinks this slowly helped her feel better until the past 2 days.     No Known Allergies    Current Outpatient Medications   Medication Sig Dispense Refill    azelastine (ASTELIN) 0.1 % nasal spray 2 sprays by Nasal route 2 times daily Use in each nostril as directed 120 mL 1    montelukast (SINGULAIR) 10 MG tablet Take 1 tablet by mouth daily 90 tablet 1    albuterol sulfate HFA (VENTOLIN HFA) 108 (90 Base) MCG/ACT inhaler Inhale 2 puffs into the lungs 4 times daily as needed for Wheezing 18 g 0    loratadine (CLARITIN) 10 MG tablet Take 1 tablet by mouth daily      DULoxetine (CYMBALTA) 60 MG extended release capsule       Norethin Ace-Eth Estrad-FE 1-20 MG-MCG(24) CHEW CHEW AND SWALLOW 1 TABLET BY MOUTH EVERY DAY      traZODone (DESYREL) 50 MG tablet Take 0.5 tablets by mouth nightly as needed      omeprazole (PRILOSEC) 40 MG delayed release capsule Take 1 capsule by mouth daily       No current facility-administered medications for this visit.     Review of Systems  Negative other than HPI.    Vitals:    02/15/24 1150   BP: 98/70   Pulse: 90   SpO2: 98%   Weight: 44.9 kg (99 lb)   Height: 1.499 m (4' 11\")

## 2024-02-15 NOTE — ASSESSMENT & PLAN NOTE
Continue flonase - 1 puff each nostril daily.  Start azelastine nasal spray twice daily.   Follow-up with established ENT given recurrence.   Referral for allergist sent  No signs of bacterial infection today.

## 2024-02-27 DIAGNOSIS — J01.01 RECURRENT MAXILLARY SINUSITIS: ICD-10-CM

## 2024-02-27 DIAGNOSIS — J30.2 SEASONAL ALLERGIC RHINITIS, UNSPECIFIED TRIGGER: ICD-10-CM

## 2024-02-27 LAB
BASOPHILS # BLD: 0 K/UL (ref 0–0.2)
BASOPHILS NFR BLD: 0.3 %
DEPRECATED RDW RBC AUTO: 13.3 % (ref 12.4–15.4)
EOSINOPHIL # BLD: 0.1 K/UL (ref 0–0.6)
EOSINOPHIL NFR BLD: 2.5 %
HCT VFR BLD AUTO: 37.5 % (ref 36–48)
HGB BLD-MCNC: 12.7 G/DL (ref 12–16)
LYMPHOCYTES # BLD: 1.6 K/UL (ref 1–5.1)
LYMPHOCYTES NFR BLD: 30.1 %
MCH RBC QN AUTO: 28.5 PG (ref 26–34)
MCHC RBC AUTO-ENTMCNC: 33.8 G/DL (ref 31–36)
MCV RBC AUTO: 84.4 FL (ref 80–100)
MONOCYTES # BLD: 0.4 K/UL (ref 0–1.3)
MONOCYTES NFR BLD: 7.6 %
NEUTROPHILS # BLD: 3.1 K/UL (ref 1.7–7.7)
NEUTROPHILS NFR BLD: 59.5 %
PLATELET # BLD AUTO: 314 K/UL (ref 135–450)
PMV BLD AUTO: 7.5 FL (ref 5–10.5)
RBC # BLD AUTO: 4.45 M/UL (ref 4–5.2)
WBC # BLD AUTO: 5.2 K/UL (ref 4–11)

## 2024-02-28 LAB
25(OH)D3 SERPL-MCNC: 27.7 NG/ML
ANION GAP SERPL CALCULATED.3IONS-SCNC: 11 MMOL/L (ref 3–16)
BUN SERPL-MCNC: 13 MG/DL (ref 7–20)
CALCIUM SERPL-MCNC: 8.9 MG/DL (ref 8.3–10.6)
CHLORIDE SERPL-SCNC: 104 MMOL/L (ref 99–110)
CO2 SERPL-SCNC: 23 MMOL/L (ref 21–32)
CREAT SERPL-MCNC: 0.6 MG/DL (ref 0.6–1.1)
GFR SERPLBLD CREATININE-BSD FMLA CKD-EPI: >60 ML/MIN/{1.73_M2}
GLUCOSE SERPL-MCNC: 70 MG/DL (ref 70–99)
POTASSIUM SERPL-SCNC: 3.9 MMOL/L (ref 3.5–5.1)
SODIUM SERPL-SCNC: 138 MMOL/L (ref 136–145)

## 2024-03-04 ENCOUNTER — TELEPHONE (OUTPATIENT)
Dept: INTERNAL MEDICINE CLINIC | Age: 24
End: 2024-03-04

## 2024-03-04 DIAGNOSIS — Z02.1 PRE-EMPLOYMENT EXAMINATION: Primary | ICD-10-CM

## 2024-03-04 NOTE — TELEPHONE ENCOUNTER
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 Thanks.

## 2024-03-04 NOTE — TELEPHONE ENCOUNTER
I have ordered a blood test.  She can come to the lab anytime they are open to have this complete.  She will not need to have a multistep TB test

## 2024-03-06 ENCOUNTER — OFFICE VISIT (OUTPATIENT)
Dept: INTERNAL MEDICINE CLINIC | Age: 24
End: 2024-03-06
Payer: COMMERCIAL

## 2024-03-06 VITALS
HEIGHT: 59 IN | SYSTOLIC BLOOD PRESSURE: 102 MMHG | WEIGHT: 98.8 LBS | HEART RATE: 85 BPM | TEMPERATURE: 98.1 F | BODY MASS INDEX: 19.92 KG/M2 | DIASTOLIC BLOOD PRESSURE: 70 MMHG | OXYGEN SATURATION: 98 %

## 2024-03-06 DIAGNOSIS — H65.03 NON-RECURRENT ACUTE SEROUS OTITIS MEDIA OF BOTH EARS: ICD-10-CM

## 2024-03-06 PROCEDURE — 99213 OFFICE O/P EST LOW 20 MIN: CPT | Performed by: NURSE PRACTITIONER

## 2024-03-06 RX ORDER — AMOXICILLIN AND CLAVULANATE POTASSIUM 875; 125 MG/1; MG/1
1 TABLET, FILM COATED ORAL 2 TIMES DAILY
Qty: 14 TABLET | Refills: 0 | Status: SHIPPED | OUTPATIENT
Start: 2024-03-06 | End: 2024-03-13

## 2024-03-06 NOTE — PROGRESS NOTES
3/6/24     Chief Complaint   Patient presents with    Sinus Problem     Nasal congestin, headache,post nasal drainage 3 days     HPI    Here for an acute visit.    Saw allergist 2 weeks ago and had allergy testing complete last week. They are planning on doing more blood work. She off all allergy medications for testing (2/22-2/29)     Allergy symptoms started day one off medication and worsened while off medication. She restarted medication on Thursday (6 days ago). Symptoms not improving and they are getting worse. The past 3 days symptoms have worsened with increased drainage, pressure, sinus pain, HA, PND, ear pain, a little fatigue. Denies fever, chills, body aches.    No Known Allergies    Current Outpatient Medications   Medication Sig Dispense Refill    amoxicillin-clavulanate (AUGMENTIN) 875-125 MG per tablet Take 1 tablet by mouth 2 times daily for 7 days 14 tablet 0    azelastine (ASTELIN) 0.1 % nasal spray 2 sprays by Nasal route 2 times daily Use in each nostril as directed 120 mL 1    montelukast (SINGULAIR) 10 MG tablet Take 1 tablet by mouth daily 90 tablet 1    albuterol sulfate HFA (VENTOLIN HFA) 108 (90 Base) MCG/ACT inhaler Inhale 2 puffs into the lungs 4 times daily as needed for Wheezing 18 g 0    loratadine (CLARITIN) 10 MG tablet Take 1 tablet by mouth daily      DULoxetine (CYMBALTA) 60 MG extended release capsule       Norethin Ace-Eth Estrad-FE 1-20 MG-MCG(24) CHEW CHEW AND SWALLOW 1 TABLET BY MOUTH EVERY DAY      traZODone (DESYREL) 50 MG tablet Take 0.5 tablets by mouth nightly as needed      omeprazole (PRILOSEC) 40 MG delayed release capsule Take 1 capsule by mouth daily       No current facility-administered medications for this visit.     Review of Systems  Negative other than HPI     Vitals:    03/06/24 1621   BP: 102/70   Site: Right Upper Arm   Position: Sitting   Cuff Size: Small Adult   Pulse: 85   Temp: 98.1 °F (36.7 °C)   SpO2: 98%   Weight: 44.8 kg (98 lb 12.8 oz)   Height:

## 2024-03-06 NOTE — ASSESSMENT & PLAN NOTE
Acute, uncontrolled.  Covering acute otitis media with antibiotics.  Use nasal rinses.  Continue antihistamines and allergy medications as prescribed.  Follow-up with allergist.

## 2024-03-13 DIAGNOSIS — Z02.1 PRE-EMPLOYMENT EXAMINATION: ICD-10-CM

## 2024-03-16 LAB
GAMMA INTERFERON BACKGROUND BLD IA-ACNC: 0.02 IU/ML
MITOGEN IGNF BCKGRD COR BLD-ACNC: >10 IU/ML
QUANTI TB GOLD PLUS: NEGATIVE
QUANTI TB1 MINUS NIL: 0 IU/ML (ref 0–0.34)
QUANTI TB2 MINUS NIL: 0 IU/ML (ref 0–0.34)

## 2024-05-11 DIAGNOSIS — J30.2 SEASONAL ALLERGIC RHINITIS, UNSPECIFIED TRIGGER: ICD-10-CM

## 2024-05-13 RX ORDER — MONTELUKAST SODIUM 10 MG/1
10 TABLET ORAL DAILY
Qty: 90 TABLET | Refills: 3 | Status: SHIPPED | OUTPATIENT
Start: 2024-05-13

## 2024-05-13 NOTE — TELEPHONE ENCOUNTER
Last OV: 3/6/2024  Next OV: Visit date not found    Next appointment due:not stated     Last fill:1/30/24  Refills:1

## 2024-05-24 ENCOUNTER — TELEMEDICINE (OUTPATIENT)
Dept: INTERNAL MEDICINE CLINIC | Age: 24
End: 2024-05-24
Payer: COMMERCIAL

## 2024-05-24 DIAGNOSIS — J34.89 SINUS PRESSURE: Primary | ICD-10-CM

## 2024-05-24 DIAGNOSIS — R09.81 NASAL CONGESTION: ICD-10-CM

## 2024-05-24 PROCEDURE — 99213 OFFICE O/P EST LOW 20 MIN: CPT | Performed by: NURSE PRACTITIONER

## 2024-05-24 ASSESSMENT — ENCOUNTER SYMPTOMS
SORE THROAT: 0
TROUBLE SWALLOWING: 0
NAUSEA: 0
WHEEZING: 0
SINUS PRESSURE: 1
SINUS PAIN: 1
SHORTNESS OF BREATH: 0
COUGH: 0
CONSTIPATION: 0
VOMITING: 0
RHINORRHEA: 0
DIARRHEA: 0
ABDOMINAL PAIN: 0

## 2024-05-24 NOTE — PROGRESS NOTES
TELEHEALTH EVALUATION -- Audio/Visual    Marley Chu, was evaluated through a synchronous (real-time) audio-video encounter. The patient (or guardian if applicable) is aware that this is a billable service, which includes applicable co-pays. This Virtual Visit was conducted with patient's (and/or legal guardian's) consent. Patient identification was verified, and a caregiver was present when appropriate.   The patient was located at Home: [id number] [id number]  Provider was located at Facility (Appt Dept): [address] [place] [address]  Confirm you are appropriately licensed, registered, or certified to deliver care in the state where the patient is located as indicated above. If you are not or unsure, please re-schedule the visit: Yes, I confirm.      Total time spent for this encounter: Not billed by time    --KULWINDER Chowdhury - CNP on 5/24/2024 at 12:55 PM    An electronic signature was used to authenticate this note.    Acute Office Visit  5/24/2024    SUBJECTIVE:    Patient ID: Marley Chu is a 23 y.o. female.    Chief Complaint   Patient presents with    Headache    Sinus Problem     [id number] covid test      HPI: The patient presents for an acute visit.    Pt reports that she has nasal congestion, post-nasal drip and sinus pressure since yesterday.    Tested negative for COVID-19 on a home test per pt.  Denies sore throat. Denies cough. Denies fevers or chills. Denies N/V, diarrhea or abdominal pain. Denies CP, SOB or wheezing.     Treatment tried and response - ibuprofen  Recent ill contacts? no  Tobacco use or second hand smoke exposure - no    No Known Allergies    Current Outpatient Medications   Medication Sig Dispense Refill    montelukast (SINGULAIR) 10 MG tablet TAKE 1 TABLET BY MOUTH DAILY 90 tablet 3    azelastine (ASTELIN) 0.1 % nasal spray 2 sprays by Nasal route 2 times daily Use in each nostril as directed 120 mL 1    albuterol sulfate HFA (VENTOLIN

## 2024-05-24 NOTE — PATIENT INSTRUCTIONS
Increase fluid water intake  Rest  Use humidifier   Tea/honey/lozenge for sore throat  Tylenol as needed for fever or pain  Afrin nasal spray for a maximum of three days  Call if symptoms worsen or fail to improve

## 2024-06-28 ENCOUNTER — OFFICE VISIT (OUTPATIENT)
Dept: INTERNAL MEDICINE CLINIC | Age: 24
End: 2024-06-28
Payer: COMMERCIAL

## 2024-06-28 VITALS
WEIGHT: 94.6 LBS | HEART RATE: 98 BPM | OXYGEN SATURATION: 98 % | DIASTOLIC BLOOD PRESSURE: 62 MMHG | SYSTOLIC BLOOD PRESSURE: 96 MMHG | BODY MASS INDEX: 19.07 KG/M2 | HEIGHT: 59 IN

## 2024-06-28 DIAGNOSIS — J01.01 RECURRENT MAXILLARY SINUSITIS: Primary | ICD-10-CM

## 2024-06-28 PROCEDURE — 99213 OFFICE O/P EST LOW 20 MIN: CPT | Performed by: INTERNAL MEDICINE

## 2024-06-28 RX ORDER — PREDNISONE 10 MG/1
10 TABLET ORAL 2 TIMES DAILY
Qty: 10 TABLET | Refills: 0 | Status: SHIPPED | OUTPATIENT
Start: 2024-06-28 | End: 2024-07-03

## 2024-06-28 RX ORDER — AZITHROMYCIN 250 MG/1
TABLET, FILM COATED ORAL
Qty: 6 TABLET | Refills: 0 | Status: SHIPPED | OUTPATIENT
Start: 2024-06-28 | End: 2024-07-08

## 2024-06-28 ASSESSMENT — ENCOUNTER SYMPTOMS
SINUS PRESSURE: 1
RHINORRHEA: 0
COUGH: 1
SINUS COMPLAINT: 1
SORE THROAT: 0
DIARRHEA: 0
ABDOMINAL PAIN: 0
HOARSE VOICE: 0
VOMITING: 0

## 2024-07-16 ENCOUNTER — OFFICE VISIT (OUTPATIENT)
Dept: INTERNAL MEDICINE CLINIC | Age: 24
End: 2024-07-16
Payer: COMMERCIAL

## 2024-07-16 VITALS
HEART RATE: 88 BPM | WEIGHT: 95.2 LBS | OXYGEN SATURATION: 99 % | SYSTOLIC BLOOD PRESSURE: 112 MMHG | BODY MASS INDEX: 19.19 KG/M2 | HEIGHT: 59 IN | DIASTOLIC BLOOD PRESSURE: 74 MMHG

## 2024-07-16 DIAGNOSIS — B07.8 COMMON WART: Primary | ICD-10-CM

## 2024-07-16 PROBLEM — J06.9 VIRAL UPPER RESPIRATORY TRACT INFECTION: Status: RESOLVED | Noted: 2023-11-10 | Resolved: 2024-07-16

## 2024-07-16 PROBLEM — H65.03 NON-RECURRENT ACUTE SEROUS OTITIS MEDIA OF BOTH EARS: Status: RESOLVED | Noted: 2024-03-06 | Resolved: 2024-07-16

## 2024-07-16 PROCEDURE — 17110 DESTRUCTION B9 LES UP TO 14: CPT | Performed by: NURSE PRACTITIONER

## 2024-07-16 PROCEDURE — 99212 OFFICE O/P EST SF 10 MIN: CPT | Performed by: NURSE PRACTITIONER

## 2024-07-16 NOTE — PROGRESS NOTES
7/16/24     Chief Complaint   Patient presents with    Other     Bump on left leg above the knee.   Noticed 1 year ago.        HPI    Patient here for a small \"bump\" on her left outer leg, first noticed a year ago.  Staying about the same in size, no changes in color. Denies any other concerns.    No Known Allergies    Current Outpatient Medications   Medication Sig Dispense Refill    montelukast (SINGULAIR) 10 MG tablet TAKE 1 TABLET BY MOUTH DAILY 90 tablet 3    azelastine (ASTELIN) 0.1 % nasal spray 2 sprays by Nasal route 2 times daily Use in each nostril as directed 120 mL 1    albuterol sulfate HFA (VENTOLIN HFA) 108 (90 Base) MCG/ACT inhaler Inhale 2 puffs into the lungs 4 times daily as needed for Wheezing 18 g 0    loratadine (CLARITIN) 10 MG tablet Take 1 tablet by mouth daily      DULoxetine (CYMBALTA) 60 MG extended release capsule       Norethin Ace-Eth Estrad-FE 1-20 MG-MCG(24) CHEW CHEW AND SWALLOW 1 TABLET BY MOUTH EVERY DAY      traZODone (DESYREL) 50 MG tablet Take 0.5 tablets by mouth nightly as needed      omeprazole (PRILOSEC) 40 MG delayed release capsule Take 1 capsule by mouth daily       No current facility-administered medications for this visit.     Review of Systems  Negative other than HPI     Vitals:    07/16/24 0939   BP: 112/74   Site: Right Upper Arm   Position: Sitting   Cuff Size: Medium Adult   Pulse: 88   SpO2: 99%   Weight: 43.2 kg (95 lb 3.2 oz)   Height: 1.499 m (4' 11\")      Physical Exam  Constitutional:       General: She is not in acute distress.     Appearance: Normal appearance. She is not ill-appearing.   HENT:      Head: Normocephalic and atraumatic.   Pulmonary:      Effort: Pulmonary effort is normal. No respiratory distress.   Skin:            Comments: Small round, non-tender lesion consistent with wart.    Neurological:      Mental Status: She is alert and oriented to person, place, and time. Mental status is at baseline.   Psychiatric:         Mood and Affect:

## 2024-09-13 DIAGNOSIS — J30.2 SEASONAL ALLERGIC RHINITIS, UNSPECIFIED TRIGGER: ICD-10-CM

## 2024-09-16 RX ORDER — MONTELUKAST SODIUM 10 MG/1
10 TABLET ORAL DAILY
Qty: 90 TABLET | Refills: 3 | Status: SHIPPED | OUTPATIENT
Start: 2024-09-16

## 2024-10-01 ENCOUNTER — OFFICE VISIT (OUTPATIENT)
Dept: INTERNAL MEDICINE CLINIC | Age: 24
End: 2024-10-01
Payer: COMMERCIAL

## 2024-10-01 VITALS
HEART RATE: 77 BPM | SYSTOLIC BLOOD PRESSURE: 100 MMHG | HEIGHT: 59 IN | WEIGHT: 95 LBS | OXYGEN SATURATION: 98 % | BODY MASS INDEX: 19.15 KG/M2 | DIASTOLIC BLOOD PRESSURE: 70 MMHG

## 2024-10-01 DIAGNOSIS — J30.2 SEASONAL ALLERGIC RHINITIS, UNSPECIFIED TRIGGER: Primary | ICD-10-CM

## 2024-10-01 DIAGNOSIS — J01.01 RECURRENT MAXILLARY SINUSITIS: ICD-10-CM

## 2024-10-01 PROCEDURE — 99213 OFFICE O/P EST LOW 20 MIN: CPT | Performed by: NURSE PRACTITIONER

## 2024-10-01 ASSESSMENT — ENCOUNTER SYMPTOMS
WHEEZING: 1
SINUS PRESSURE: 1
SHORTNESS OF BREATH: 0
COUGH: 0
RHINORRHEA: 1

## 2024-10-01 NOTE — ASSESSMENT & PLAN NOTE
Chronic, intermittent.   Continue loratadine, montelukast, and Flonase.   Start albuterol 4 times a day while awake and ill.   Start azelastine nasal spray twice daily.      Discussed antibiotics and steroids - pt will send message on Thursday if worsening or not improving.

## 2024-10-01 NOTE — PROGRESS NOTES
10/1/24     Chief Complaint   Patient presents with    Fatigue     \"Been going on since summer\"     Facial Pain     HPI    Here for an acute visit today  URI symptoms started about 1 week ago   Having facial pressure, ear pressure, fatigue   Denies fever, chills, body aches   Some wheezing/ tightness that started yesterday   She is taking claritin, singulair and flonase consistently   Restarted using azetastine nasal spray yesterday which has helped some today.     She saw ENT in Jan - due for follow up.    Had allergy testing complete in the spring with Dr. Sanchez. Was on Odactra (dust mite tablet) for about a month - stopped taking due to recurrent infection.     Last on antibiotics in June for sinus infection.     No Known Allergies    Current Outpatient Medications   Medication Sig Dispense Refill    montelukast (SINGULAIR) 10 MG tablet Take 1 tablet by mouth daily 90 tablet 3    azelastine (ASTELIN) 0.1 % nasal spray 2 sprays by Nasal route 2 times daily Use in each nostril as directed 120 mL 1    albuterol sulfate HFA (VENTOLIN HFA) 108 (90 Base) MCG/ACT inhaler Inhale 2 puffs into the lungs 4 times daily as needed for Wheezing 18 g 0    loratadine (CLARITIN) 10 MG tablet Take 1 tablet by mouth daily      DULoxetine (CYMBALTA) 60 MG extended release capsule       Norethin Ace-Eth Estrad-FE 1-20 MG-MCG(24) CHEW CHEW AND SWALLOW 1 TABLET BY MOUTH EVERY DAY      traZODone (DESYREL) 50 MG tablet Take 0.5 tablets by mouth nightly as needed      omeprazole (PRILOSEC) 40 MG delayed release capsule Take 1 capsule by mouth daily       No current facility-administered medications for this visit.     Review of Systems   Constitutional:  Positive for fatigue. Negative for chills and fever.   HENT:  Positive for congestion, ear pain, rhinorrhea and sinus pressure.    Respiratory:  Positive for wheezing. Negative for cough and shortness of breath.    Cardiovascular:  Negative for chest pain, palpitations and leg

## 2024-10-29 LAB — PAP SMEAR, EXTERNAL: NEGATIVE

## 2024-11-11 ENCOUNTER — PATIENT MESSAGE (OUTPATIENT)
Dept: ENT CLINIC | Age: 24
End: 2024-11-11

## 2024-11-12 ENCOUNTER — TELEMEDICINE (OUTPATIENT)
Age: 24
End: 2024-11-12
Payer: COMMERCIAL

## 2024-11-12 DIAGNOSIS — J30.81 ALLERGIC REACTION TO ANIMAL: ICD-10-CM

## 2024-11-12 DIAGNOSIS — J30.2 SEASONAL ALLERGIC RHINITIS, UNSPECIFIED TRIGGER: ICD-10-CM

## 2024-11-12 DIAGNOSIS — J30.2 SEASONAL ALLERGIC RHINITIS, UNSPECIFIED TRIGGER: Primary | ICD-10-CM

## 2024-11-12 PROCEDURE — 99213 OFFICE O/P EST LOW 20 MIN: CPT | Performed by: NURSE PRACTITIONER

## 2024-11-12 RX ORDER — FLUTICASONE PROPIONATE 50 MCG
SPRAY, SUSPENSION (ML) NASAL
Qty: 48 G | Refills: 2 | Status: SHIPPED | OUTPATIENT
Start: 2024-11-12

## 2024-11-12 RX ORDER — FLUTICASONE PROPIONATE 50 MCG
SPRAY, SUSPENSION (ML) NASAL
Qty: 16 EACH | Refills: 0 | Status: SHIPPED | OUTPATIENT
Start: 2024-11-12 | End: 2024-11-12

## 2024-11-12 ASSESSMENT — ENCOUNTER SYMPTOMS
RHINORRHEA: 1
COUGH: 0
SHORTNESS OF BREATH: 0
VOICE CHANGE: 0
WHEEZING: 0
EYE DISCHARGE: 1
EYE ITCHING: 1
TROUBLE SWALLOWING: 0

## 2024-11-12 NOTE — PROGRESS NOTES
Objective:  Patient-Reported Vitals  Patient-Reported Weight: 94  Patient-Reported Height: 4'11           11/11/2024    11:46 AM   Patient-Reported Vitals   Patient-Reported Weight 94   Patient-Reported Height 4'11        Physical Exam:  [INSTRUCTIONS:  \"[x]\" Indicates a positive item  \"[]\" Indicates a negative item  -- DELETE ALL ITEMS NOT EXAMINED]    Constitutional: [x] Appears well-developed and well-nourished [x] No apparent distress      [] Abnormal -     Mental status: [x] Alert and awake  [x] Oriented to person/place/time [x] Able to follow commands    [] Abnormal -     Eyes:   EOM    [x]  Normal    [] Abnormal -   Sclera  [x]  Normal    [] Abnormal -          Discharge [x]  None visible   [] Abnormal -     HENT: [x] Normocephalic, atraumatic  [] Abnormal -   [x] Mouth/Throat: Mucous membranes are moist    External Ears [x] Normal  [] Abnormal -    Neck: [x] No visualized mass [] Abnormal -     Pulmonary/Chest: [x] Respiratory effort normal   [x] No visualized signs of difficulty breathing or respiratory distress        [] Abnormal -      Musculoskeletal:   [] Normal gait with no signs of ataxia         [x] Normal range of motion of neck        [] Abnormal -     Neurological:        [x] No Facial Asymmetry (Cranial nerve 7 motor function) (limited exam due to video visit)          [x] No gaze palsy        [] Abnormal -          Skin:        [x] No significant exanthematous lesions or discoloration noted on facial skin         [] Abnormal -            Psychiatric:       [x] Normal Affect [] Abnormal -        [] No Hallucinations    Other pertinent observable physical exam findings:-        On this date 11/12/2024 I have spent 21 minutes reviewing previous notes, test results and face to face (virtual) with the patient discussing the diagnosis and importance of compliance with the treatment plan as well as documenting on the day of the visit.    Marley Chu, was evaluated through a synchronous

## 2024-12-02 ENCOUNTER — TELEPHONE (OUTPATIENT)
Dept: ENT CLINIC | Age: 24
End: 2024-12-02

## 2024-12-02 NOTE — TELEPHONE ENCOUNTER
Pt's mother called in and scheduled first available (1/8/25) for her daughter but is concerned due to the vertigo episodes she has been having. Would like to be seen sooner if possible. Please advise.

## 2024-12-05 ENCOUNTER — OFFICE VISIT (OUTPATIENT)
Dept: INTERNAL MEDICINE CLINIC | Age: 24
End: 2024-12-05

## 2024-12-05 ENCOUNTER — OFFICE VISIT (OUTPATIENT)
Dept: ENT CLINIC | Age: 24
End: 2024-12-05
Payer: COMMERCIAL

## 2024-12-05 VITALS
SYSTOLIC BLOOD PRESSURE: 113 MMHG | WEIGHT: 94 LBS | HEIGHT: 59 IN | BODY MASS INDEX: 18.95 KG/M2 | OXYGEN SATURATION: 98 % | DIASTOLIC BLOOD PRESSURE: 77 MMHG | HEART RATE: 98 BPM | TEMPERATURE: 97.6 F

## 2024-12-05 VITALS
BODY MASS INDEX: 19.19 KG/M2 | HEIGHT: 59 IN | SYSTOLIC BLOOD PRESSURE: 120 MMHG | OXYGEN SATURATION: 98 % | HEART RATE: 88 BPM | DIASTOLIC BLOOD PRESSURE: 80 MMHG | WEIGHT: 95.2 LBS | TEMPERATURE: 98.1 F

## 2024-12-05 DIAGNOSIS — G47.00 INSOMNIA, UNSPECIFIED TYPE: ICD-10-CM

## 2024-12-05 DIAGNOSIS — J01.90 ACUTE RHINOSINUSITIS: Primary | ICD-10-CM

## 2024-12-05 DIAGNOSIS — J10.1 INFLUENZA A: ICD-10-CM

## 2024-12-05 DIAGNOSIS — R53.83 OTHER FATIGUE: ICD-10-CM

## 2024-12-05 DIAGNOSIS — J10.1 INFLUENZA A: Primary | ICD-10-CM

## 2024-12-05 DIAGNOSIS — J32.0 CHRONIC MAXILLARY SINUSITIS: ICD-10-CM

## 2024-12-05 LAB
INFLUENZA A ANTIBODY: NORMAL
INFLUENZA B ANTIBODY: NEGATIVE
Lab: 9753
QC PASS/FAIL: NORMAL
S PYO AG THROAT QL: NORMAL
SARS-COV-2 RDRP RESP QL NAA+PROBE: NEGATIVE

## 2024-12-05 PROCEDURE — 99213 OFFICE O/P EST LOW 20 MIN: CPT | Performed by: OTOLARYNGOLOGY

## 2024-12-05 RX ORDER — DULOXETINE 40 MG/1
CAPSULE, DELAYED RELEASE ORAL
COMMUNITY
Start: 2024-11-10 | End: 2024-12-05

## 2024-12-05 RX ORDER — DEXTROAMPHETAMINE SACCHARATE, AMPHETAMINE ASPARTATE MONOHYDRATE, DEXTROAMPHETAMINE SULFATE AND AMPHETAMINE SULFATE 2.5; 2.5; 2.5; 2.5 MG/1; MG/1; MG/1; MG/1
10 CAPSULE, EXTENDED RELEASE ORAL EVERY MORNING
COMMUNITY
Start: 2024-11-05 | End: 2025-01-30

## 2024-12-05 RX ORDER — METHYLPREDNISOLONE 4 MG/1
TABLET ORAL
Qty: 1 KIT | Refills: 0 | Status: SHIPPED | OUTPATIENT
Start: 2024-12-05

## 2024-12-05 SDOH — ECONOMIC STABILITY: FOOD INSECURITY: WITHIN THE PAST 12 MONTHS, YOU WORRIED THAT YOUR FOOD WOULD RUN OUT BEFORE YOU GOT MONEY TO BUY MORE.: NEVER TRUE

## 2024-12-05 SDOH — ECONOMIC STABILITY: FOOD INSECURITY: WITHIN THE PAST 12 MONTHS, THE FOOD YOU BOUGHT JUST DIDN'T LAST AND YOU DIDN'T HAVE MONEY TO GET MORE.: NEVER TRUE

## 2024-12-05 SDOH — ECONOMIC STABILITY: INCOME INSECURITY: HOW HARD IS IT FOR YOU TO PAY FOR THE VERY BASICS LIKE FOOD, HOUSING, MEDICAL CARE, AND HEATING?: NOT VERY HARD

## 2024-12-05 NOTE — PROGRESS NOTES
and all orders for this visit:    Acute rhinosinusitis  -     methylPREDNISolone (MEDROL DOSEPACK) 4 MG tablet; Take by mouth, as directed by package instructions.    Influenza A           RECOMMENDATIONS / PLAN:   Call for antibiotic if symptoms continue in 7 days.  Prescription drug management: Prescribed Meclizine 25 po TID prn vertigo.   Patient was advised to read all information given by the pharmacist regarding medications, particularly regarding possible side effects and adverse effects.    Return in about 25 days (around 12/30/2024).         Patient Instructions   Please read and follow these instructions.  Please call the office and speak to the MA or LPN with any questions regarding these instructions.      CARE FOR ACUTE RHINOSINUSITIS  Use a 12 hour decongestant spray, 0.05% oxymetazoline nasal solution (e.g. Afrin, Duration, 4-Way).  Spray each nostril twice three times a day for three days, then two times a day for 2 days, and then stop for two days and then repeat the cycle once.  Take one Mucinex-D (red orange box) maximum strength tablet each morning and one Mucinex (blue box) maximum strength tablet each evening, about 12 hours later, daily for the next ten days.   A steam inhaler mask device may be helpful.  These can be purchased at your pharmacy.  Use NeilMed sinus rinse system to help to clear secretions and drainage.  It may take several days to several weeks for your sinusitis to clear up.  It is important to take all your medications as prescribed.  Please continue your antibiotics as prescribed.    Please call the office if your condition worsens.         Pascual Ghotra MD    [facility]  Department of Otolaryngology/Head and Neck Surgery  [facility]  [address], [address]  [place] [address]  [phone]

## 2024-12-05 NOTE — ASSESSMENT & PLAN NOTE
Chronic, intermittent.   Continue loratadine, montelukast, azelastine and Flonase.   Use albuterol prn   Keep appt with ENT today for follow up

## 2024-12-05 NOTE — PROGRESS NOTES
12/5/24     Chief Complaint   Patient presents with    Facial Pain    Ear Pain    Dizziness    Fatigue    Chest Congestion    Sweats     HPI    Here for an acute visit   She has recurrent sinusitis   She saw [place] with the virtual team on 11/12 for pruritus and allergic rhinitis. Thought to secondary to cat exposure, she was advised to restart flonase.  She reported she was still taking singulair, claritin and astelin nasal spray.      Sunday / Monday she started with sinus drainage, pressure, bilateral ear pain, chills, sweats, mild SOB, body aches, fatigue.    Denies fever, cough.  Denies any known exposures to flu, strep, covid.   She is taking singulair, flonase, astelin and claritin. She has used ibuprofen with some relief.      Vertigo started a few weeks ago and is worse this week.      She has an appt with Dr. Ghotra, ENT this afternoon.     Complains of chronic and intermittent fatigue, getting worse. Takes trazodone prn for sleep and makes her tired the next day. Tired all day every day. Has intermittent brain fog. Has been going on a few months.     No Known Allergies    Current Outpatient Medications   Medication Sig Dispense Refill    amphetamine-dextroamphetamine (ADDERALL XR) 10 MG extended release capsule Take 1 capsule by mouth every morning.      fluticasone (FLONASE) 50 MCG/ACT nasal spray 2 SPRAYS IN EACH NOSTRIL IN THE MORNING AND AT BEDTIME FOR 4 DAYS, THEN 2 SPRAYS DAILY FOR 10 DAYS, THEN 1 SPRAY DAILY FOR FOR 14 DAYS 48 g 2    montelukast (SINGULAIR) 10 MG tablet Take 1 tablet by mouth daily 90 tablet 3    azelastine (ASTELIN) 0.1 % nasal spray 2 sprays by Nasal route 2 times daily Use in each nostril as directed 120 mL 1    albuterol sulfate HFA (VENTOLIN HFA) 108 (90 Base) MCG/ACT inhaler Inhale 2 puffs into the lungs 4 times daily as needed for Wheezing 18 g 0    loratadine (CLARITIN) 10 MG tablet Take 1 tablet by mouth daily      DULoxetine (CYMBALTA) 60 MG extended release capsule

## 2024-12-05 NOTE — ASSESSMENT & PLAN NOTE
Chronic. Continue trazodone 25 mg nightly prn. Unclear if this is causing increased fatigue.  Continue seeing psychiatry and psychology.     Orders:    CBC; Future    Hemoglobin A1C; Future    Vitamin D 25 Hydroxy; Future    TSH with Reflex to FT4 ([place] Only); Future    Vitamin B12 & Folate; Future    Comprehensive Metabolic Panel; Future

## 2024-12-05 NOTE — PATIENT INSTRUCTIONS
Please read and follow these instructions.  Please call the office and speak to the MA or LPN with any questions regarding these instructions.      CARE FOR ACUTE RHINOSINUSITIS  Use a 12 hour decongestant spray, 0.05% oxymetazoline nasal solution (e.g. Afrin, Duration, 4-Way).  Spray each nostril twice three times a day for three days, then two times a day for 2 days, and then stop for two days and then repeat the cycle once.  Take one Mucinex-D (red orange box) maximum strength tablet each morning and one Mucinex (blue box) maximum strength tablet each evening, about 12 hours later, daily for the next ten days.   A steam inhaler mask device may be helpful.  These can be purchased at your pharmacy.  Use NeilMed sinus rinse system to help to clear secretions and drainage.  It may take several days to several weeks for your sinusitis to clear up.  It is important to take all your medications as prescribed.  Please continue your antibiotics as prescribed.    Please call the office if your condition worsens.

## 2024-12-12 ENCOUNTER — PATIENT MESSAGE (OUTPATIENT)
Dept: ENT CLINIC | Age: 24
End: 2024-12-12

## 2024-12-12 DIAGNOSIS — B96.89 ACUTE BACTERIAL RHINOSINUSITIS: Primary | ICD-10-CM

## 2024-12-12 DIAGNOSIS — J01.90 ACUTE BACTERIAL RHINOSINUSITIS: Primary | ICD-10-CM

## 2024-12-12 RX ORDER — CEFDINIR 300 MG/1
300 CAPSULE ORAL 2 TIMES DAILY
Qty: 20 CAPSULE | Refills: 0 | Status: SHIPPED | OUTPATIENT
Start: 2024-12-12 | End: 2024-12-22

## 2024-12-12 NOTE — TELEPHONE ENCOUNTER
Please call Marley and advise that an E-script was sent to her pharmacy for cefdinir for 10 days.  She should also repeat all the sinus measures given at the last visit.

## 2024-12-13 NOTE — TELEPHONE ENCOUNTER
Called pt and LMOVM advising her that Rx was sent to pharmacy and to continue sinus measures given at last appointment.

## 2024-12-30 ENCOUNTER — OFFICE VISIT (OUTPATIENT)
Dept: ENT CLINIC | Age: 24
End: 2024-12-30
Payer: COMMERCIAL

## 2024-12-30 VITALS
WEIGHT: 95 LBS | TEMPERATURE: 97.4 F | BODY MASS INDEX: 19.15 KG/M2 | SYSTOLIC BLOOD PRESSURE: 121 MMHG | DIASTOLIC BLOOD PRESSURE: 79 MMHG | OXYGEN SATURATION: 98 % | HEART RATE: 83 BPM | HEIGHT: 59 IN

## 2024-12-30 DIAGNOSIS — J30.2 SEASONAL ALLERGIC RHINITIS, UNSPECIFIED TRIGGER: Chronic | ICD-10-CM

## 2024-12-30 DIAGNOSIS — J01.90 ACUTE BACTERIAL RHINOSINUSITIS: ICD-10-CM

## 2024-12-30 DIAGNOSIS — R44.8 SENSATION OF PRESSURE IN FACE: Primary | Chronic | ICD-10-CM

## 2024-12-30 DIAGNOSIS — J32.9 RECURRENT SINUSITIS: Chronic | ICD-10-CM

## 2024-12-30 DIAGNOSIS — B96.89 ACUTE BACTERIAL RHINOSINUSITIS: ICD-10-CM

## 2024-12-30 PROCEDURE — 99214 OFFICE O/P EST MOD 30 MIN: CPT | Performed by: OTOLARYNGOLOGY

## 2024-12-30 NOTE — PROGRESS NOTES
CHIEF COMPLAINT  Chief Complaint   Patient presents with    Sinusitis     Recheck.    Allergic Rhinitis        HISTORY OF PRESENT ILLNESS    Marley Chu is a 24 y.o. female here for recheck after acute rhinosinusitis.  Patient stated that the cefdinir antibiotic worked, and the infection symptoms, of pain and more pressure and greenish nasal drainage, all cleared up.  She further stated that she is now back to her baseline of mild pressure in the face, cheeks and forehead, which has been going on for about two years, and is \"always there but worse in the winter, around October.  She has a history of seasonal/environmental allergies, for which she has been followed by Duran Sanchez.  However Dr. Sanchez no longer participates with her insurance plan.  She has never been treated with allergy immunotherapy.  She typically uses montelukast/Singulair, over-the-counter Walitin (generic Claritin), and fluticasone/Flonase.   She has had recurrent sinus infections, with \"four\" episodes in the past year, \"nine\" episodes in the year before, and \"maybe two\" in the year before that, none prior to 3 years ago.          REVIEW OF SYSTEMS  Constitutional:  Denied fever and chills.  ENT/sinus:  Denied otalgia, otorrhea, nasal pain, rhinorrhea, sore throat, and sinus/facial pain.        EXAMINATION    Vitals:    12/30/24 1425   BP: 121/79   Site: Left Upper Arm   Position: Sitting   Cuff Size: Medium Adult   Pulse: 83   Temp: 97.4 °F (36.3 °C)   TempSrc: Infrared   SpO2: 98%   Weight: 43.1 kg (95 lb)   Height: 1.499 m (4' 11\")     WDWN, NAD  Face:  Normal skin.    Voice:  Normal, with no hoarseness, breathiness, or hot potato quality.  Ears:   TMs and EACs were normal.  The mastoids and pinnae were normal.    (+) Nose: There was mild rightward NSD, with patent nasal airway bilaterally.  Otherwise, normal.    Sinuses: Nontender x 4   Throat,  OC/OP:  Normal.    Neck:  NT, No masses.  Trachea midline.    Nodes:  No

## 2024-12-30 NOTE — PATIENT INSTRUCTIONS
Please read and follow these instructions.  Please call the office and speak to the MA or LPN with any questions regarding these instructions.    Afrin rhinogenic headache test:  Use 0.05% oxymetazoline nasal decongestant spray (eg. Afrin, Duration, 4-Way) two sprays in both nostrils twice daily for 7 days and then stop.  Never use nasal decongestant sprays for longer than six to ten consecutive days.       20-Aug-2018 20:20

## 2025-01-15 ENCOUNTER — TELEPHONE (OUTPATIENT)
Dept: ENT CLINIC | Age: 25
End: 2025-01-15

## 2025-01-15 NOTE — TELEPHONE ENCOUNTER
Pt called in stating  told her to call the office when she was ready to schedule MRI. Informed her I would send over a msg and someone would give her a call back.

## 2025-01-17 DIAGNOSIS — G47.00 INSOMNIA, UNSPECIFIED TYPE: ICD-10-CM

## 2025-01-17 DIAGNOSIS — R53.83 OTHER FATIGUE: ICD-10-CM

## 2025-01-17 LAB
25(OH)D3 SERPL-MCNC: 29.2 NG/ML
FOLATE SERPL-MCNC: 18.8 NG/ML (ref 4.78–24.2)
VIT B12 SERPL-MCNC: 603 PG/ML (ref 211–911)

## 2025-01-18 LAB
ALBUMIN SERPL-MCNC: 4.4 G/DL (ref 3.4–5)
ALBUMIN/GLOB SERPL: 1.6 {RATIO} (ref 1.1–2.2)
ALP SERPL-CCNC: 57 U/L (ref 40–129)
ALT SERPL-CCNC: 36 U/L (ref 10–40)
ANION GAP SERPL CALCULATED.3IONS-SCNC: 11 MMOL/L (ref 3–16)
AST SERPL-CCNC: 25 U/L (ref 15–37)
BILIRUB SERPL-MCNC: <0.2 MG/DL (ref 0–1)
BUN SERPL-MCNC: 14 MG/DL (ref 7–20)
CALCIUM SERPL-MCNC: 9.1 MG/DL (ref 8.3–10.6)
CHLORIDE SERPL-SCNC: 104 MMOL/L (ref 99–110)
CO2 SERPL-SCNC: 24 MMOL/L (ref 21–32)
CREAT SERPL-MCNC: 0.7 MG/DL (ref 0.6–1.1)
DEPRECATED RDW RBC AUTO: 14.1 % (ref 12.4–15.4)
EST. AVERAGE GLUCOSE BLD GHB EST-MCNC: 91.1 MG/DL
GFR SERPLBLD CREATININE-BSD FMLA CKD-EPI: >90 ML/MIN/{1.73_M2}
GLUCOSE SERPL-MCNC: 86 MG/DL (ref 70–99)
HBA1C MFR BLD: 4.8 %
HCT VFR BLD AUTO: 38 % (ref 36–48)
HGB BLD-MCNC: 12.8 G/DL (ref 12–16)
MCH RBC QN AUTO: 29.2 PG (ref 26–34)
MCHC RBC AUTO-ENTMCNC: 33.6 G/DL (ref 31–36)
MCV RBC AUTO: 86.8 FL (ref 80–100)
PLATELET # BLD AUTO: 311 K/UL (ref 135–450)
PMV BLD AUTO: 7.5 FL (ref 5–10.5)
POTASSIUM SERPL-SCNC: 4.1 MMOL/L (ref 3.5–5.1)
PROT SERPL-MCNC: 7.2 G/DL (ref 6.4–8.2)
RBC # BLD AUTO: 4.38 M/UL (ref 4–5.2)
SODIUM SERPL-SCNC: 139 MMOL/L (ref 136–145)
TSH SERPL DL<=0.005 MIU/L-ACNC: 2.03 UIU/ML (ref 0.27–4.2)
WBC # BLD AUTO: 4.3 K/UL (ref 4–11)

## 2025-01-18 NOTE — TELEPHONE ENCOUNTER
I don't see any mention in any of my notes regarding an MRI scan.  An MRI scan would not be an imaging study done for sinusitis.  At the last visit, she was instructed to return if sinus symptoms recur.  She should schedule an appointment for follow up and recheck if still having sinus problems.

## 2025-01-19 ENCOUNTER — PATIENT MESSAGE (OUTPATIENT)
Dept: ENT CLINIC | Age: 25
End: 2025-01-19

## 2025-01-21 ENCOUNTER — OFFICE VISIT (OUTPATIENT)
Dept: INTERNAL MEDICINE CLINIC | Age: 25
End: 2025-01-21

## 2025-01-21 VITALS
TEMPERATURE: 98 F | DIASTOLIC BLOOD PRESSURE: 68 MMHG | BODY MASS INDEX: 19.76 KG/M2 | SYSTOLIC BLOOD PRESSURE: 100 MMHG | OXYGEN SATURATION: 99 % | HEART RATE: 92 BPM | HEIGHT: 59 IN | WEIGHT: 98 LBS

## 2025-01-21 DIAGNOSIS — J06.9 VIRAL URI WITH COUGH: Primary | ICD-10-CM

## 2025-01-21 DIAGNOSIS — F32.A ANXIETY AND DEPRESSION: ICD-10-CM

## 2025-01-21 DIAGNOSIS — F41.9 ANXIETY AND DEPRESSION: ICD-10-CM

## 2025-01-21 LAB
INFLUENZA A ANTIBODY: NEGATIVE
INFLUENZA B ANTIBODY: NEGATIVE
Lab: NORMAL
QC PASS/FAIL: NORMAL
S PYO AG THROAT QL: NORMAL
SARS-COV-2 RDRP RESP QL NAA+PROBE: NEGATIVE

## 2025-01-21 RX ORDER — EPINEPHRINE 0.3 MG/.3ML
INJECTION SUBCUTANEOUS
COMMUNITY

## 2025-01-21 SDOH — ECONOMIC STABILITY: FOOD INSECURITY: WITHIN THE PAST 12 MONTHS, THE FOOD YOU BOUGHT JUST DIDN'T LAST AND YOU DIDN'T HAVE MONEY TO GET MORE.: NEVER TRUE

## 2025-01-21 SDOH — ECONOMIC STABILITY: FOOD INSECURITY: WITHIN THE PAST 12 MONTHS, YOU WORRIED THAT YOUR FOOD WOULD RUN OUT BEFORE YOU GOT MONEY TO BUY MORE.: NEVER TRUE

## 2025-01-21 ASSESSMENT — PATIENT HEALTH QUESTIONNAIRE - PHQ9
7. TROUBLE CONCENTRATING ON THINGS, SUCH AS READING THE NEWSPAPER OR WATCHING TELEVISION: SEVERAL DAYS
5. POOR APPETITE OR OVEREATING: MORE THAN HALF THE DAYS
1. LITTLE INTEREST OR PLEASURE IN DOING THINGS: SEVERAL DAYS
6. FEELING BAD ABOUT YOURSELF - OR THAT YOU ARE A FAILURE OR HAVE LET YOURSELF OR YOUR FAMILY DOWN: MORE THAN HALF THE DAYS
SUM OF ALL RESPONSES TO PHQ QUESTIONS 1-9: 11
SUM OF ALL RESPONSES TO PHQ9 QUESTIONS 1 & 2: 2
4. FEELING TIRED OR HAVING LITTLE ENERGY: MORE THAN HALF THE DAYS
SUM OF ALL RESPONSES TO PHQ QUESTIONS 1-9: 11
2. FEELING DOWN, DEPRESSED OR HOPELESS: SEVERAL DAYS
SUM OF ALL RESPONSES TO PHQ QUESTIONS 1-9: 11
3. TROUBLE FALLING OR STAYING ASLEEP: MORE THAN HALF THE DAYS
8. MOVING OR SPEAKING SO SLOWLY THAT OTHER PEOPLE COULD HAVE NOTICED. OR THE OPPOSITE, BEING SO FIGETY OR RESTLESS THAT YOU HAVE BEEN MOVING AROUND A LOT MORE THAN USUAL: NOT AT ALL
9. THOUGHTS THAT YOU WOULD BE BETTER OFF DEAD, OR OF HURTING YOURSELF: NOT AT ALL
SUM OF ALL RESPONSES TO PHQ QUESTIONS 1-9: 11
10. IF YOU CHECKED OFF ANY PROBLEMS, HOW DIFFICULT HAVE THESE PROBLEMS MADE IT FOR YOU TO DO YOUR WORK, TAKE CARE OF THINGS AT HOME, OR GET ALONG WITH OTHER PEOPLE: SOMEWHAT DIFFICULT

## 2025-01-21 NOTE — ASSESSMENT & PLAN NOTE
Chronic. Moderately controlled, abnormal depression screen today.   Continue seeing psychiatry and psychology as scheduled.

## 2025-01-21 NOTE — PROGRESS NOTES
1/21/25     Chief Complaint   Patient presents with    Head Congestion    Generalized Body Aches    Cough    Pharyngitis     HPI    Here for acute visit today   Has a 3-4 day history of head congestion, cough, PND, body aches, fatigue, sore throat, bilateral ear pressure and pain.   Denies fevers. 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 She took ibuprofen with acute illness.  Mild SOB has not used albuterol. Hot showers help. Has a humidifier.      Had the flu in Dec in addition to recurrent URIs over the year. She saw Dr. Sanchez at one time, he had some office changes and is due to follow up. She is seeing ENT as well, does not have a follow up scheduled.     Reports mood is okay, seeing psychology and psychiatry. Denies SI/HI. Declines any intervention.     Allergies   Allergen Reactions    Cats Claw (Uncaria Tomentosa)     Dust Mite Extract     Pollen Extract     Tall Ragweed      Current Outpatient Medications   Medication Sig Dispense Refill    EPINEPHrine (EPIPEN) 0.3 MG/0.3ML SOAJ injection INJECT IN THE MUSCLE ON OUTER THIGH FOR ANAPHYLAXIS. REPEAT IF NEEDED      amphetamine-dextroamphetamine (ADDERALL XR) 10 MG extended release capsule Take 1 capsule by mouth every morning.      fluticasone (FLONASE) 50 MCG/ACT nasal spray 2 SPRAYS IN EACH NOSTRIL IN THE MORNING AND AT BEDTIME FOR 4 DAYS, THEN 2 SPRAYS DAILY FOR 10 DAYS, THEN 1 SPRAY DAILY FOR FOR 14 DAYS 48 g 2    montelukast (SINGULAIR) 10 MG tablet Take 1 tablet by mouth daily 90 tablet 3    azelastine (ASTELIN) 0.1 % nasal spray 2 sprays by Nasal route 2 times daily Use in each nostril as directed 120 mL 1    albuterol sulfate HFA (VENTOLIN HFA) 108 (90 Base) MCG/ACT inhaler Inhale 2 puffs into the lungs 4 times daily as needed for Wheezing 18 g 0    loratadine (CLARITIN) 10 MG tablet Take 1 tablet by mouth daily      DULoxetine (CYMBALTA) 60 MG extended release capsule

## 2025-01-28 NOTE — TELEPHONE ENCOUNTER
Please see my message of 01/18/25 regarding the MRI scan.  She may see her PCP or urgent care for treatment of acute sinusitis.  She should schedule an appointment with me asap for evaluation of the chronic sinus problem and to determine if surgery is needed.

## 2025-02-04 ENCOUNTER — OFFICE VISIT (OUTPATIENT)
Dept: INTERNAL MEDICINE CLINIC | Age: 25
End: 2025-02-04

## 2025-02-04 VITALS
WEIGHT: 97.8 LBS | BODY MASS INDEX: 19.72 KG/M2 | OXYGEN SATURATION: 98 % | HEIGHT: 59 IN | SYSTOLIC BLOOD PRESSURE: 100 MMHG | TEMPERATURE: 97.8 F | DIASTOLIC BLOOD PRESSURE: 68 MMHG | HEART RATE: 91 BPM

## 2025-02-04 DIAGNOSIS — R52 BODY ACHES: ICD-10-CM

## 2025-02-04 DIAGNOSIS — J32.9 RECURRENT SINUSITIS: Primary | ICD-10-CM

## 2025-02-04 DIAGNOSIS — R11.0 NAUSEA: ICD-10-CM

## 2025-02-04 RX ORDER — BUPROPION HYDROCHLORIDE 150 MG/1
TABLET ORAL EVERY MORNING
COMMUNITY
Start: 2025-02-03

## 2025-02-04 ASSESSMENT — ENCOUNTER SYMPTOMS
DIARRHEA: 0
VOMITING: 0
SORE THROAT: 0
SHORTNESS OF BREATH: 1
RHINORRHEA: 0
COUGH: 0
WHEEZING: 0
NAUSEA: 1
ABDOMINAL PAIN: 0

## 2025-02-04 NOTE — PROGRESS NOTES
Office Visit   2/4/2025    Assessment and Plan:  1. Recurrent sinusitis  Assessment & Plan:  -  chronic, uncontrolled.   -  order placed for CT of sinus-will treat based on results.  -  keep scheduled appt on 2/14/25 with ENT.  -  continue current regimen  -  schedule follow-up with Allergist    Orders:  -     CT SINUS WO CONTRAST; Future  2. Nausea  Assessment & Plan:  -  acute, new problem  -  offered zofran-pt declined  -  encouraged hydration    3. Body aches  Assessment & Plan:  - flu and covid are negative.  Orders:  -     POCT COVID-19 Rapid, NAAT  -     POCT Influenza A/B       No follow-ups on file.     Subjective:  Chief Complaint   Patient presents with    Generalized Body Aches     Body aches, ear ache, headache, dizziness x 2 days        HPI:   Marley Chu is a 24 y.o. female who presents to the clinic today for acute visit.    Patient was seen by BUBBA Shaw on 1/21/25 for viral URI.   Continues with headache, vertigo, fatigue, body aches, nausea at night.  Decreased appetite.  Denies fever.    Using flonase, claritin and singulair daily.  She has been taking Ibuprofen and sleeping.    Had flu in dec in addition to recurrent URIs over the year.  Has ENT appointment scheduled for 2/14/25.    .Review of Systems   Constitutional:  Positive for fatigue. Negative for chills and fever.   HENT:  Positive for congestion and ear pain. Negative for postnasal drip, rhinorrhea and sore throat.    Respiratory:  Positive for shortness of breath. Negative for cough and wheezing.    Cardiovascular:  Negative for chest pain.   Gastrointestinal:  Positive for nausea. Negative for abdominal pain, diarrhea and vomiting.   Musculoskeletal:  Positive for myalgias.   Neurological:  Positive for light-headedness and headaches. Negative for dizziness.       Allergies   Allergen Reactions    Cats Claw (Uncaria Tomentosa)     Dust Mite Extract     Pollen Extract     Tall Ragweed      Current Outpatient Rx   Medication Sig

## 2025-02-05 NOTE — ASSESSMENT & PLAN NOTE
-  chronic, uncontrolled.   -  order placed for CT of sinus-will treat based on results.  -  keep scheduled appt on 2/14/25 with ENT.  -  continue current regimen  -  schedule follow-up with Allergist

## 2025-02-12 ENCOUNTER — OFFICE VISIT (OUTPATIENT)
Dept: ENT CLINIC | Age: 25
End: 2025-02-12
Payer: COMMERCIAL

## 2025-02-12 VITALS
TEMPERATURE: 97.5 F | HEART RATE: 106 BPM | DIASTOLIC BLOOD PRESSURE: 78 MMHG | WEIGHT: 94 LBS | OXYGEN SATURATION: 99 % | BODY MASS INDEX: 18.95 KG/M2 | SYSTOLIC BLOOD PRESSURE: 114 MMHG | HEIGHT: 59 IN

## 2025-02-12 DIAGNOSIS — J32.9 RECURRENT SINUS INFECTIONS: Chronic | ICD-10-CM

## 2025-02-12 DIAGNOSIS — G44.89 OTHER HEADACHE SYNDROME: Chronic | ICD-10-CM

## 2025-02-12 DIAGNOSIS — J01.90 ACUTE RHINOSINUSITIS: Primary | ICD-10-CM

## 2025-02-12 DIAGNOSIS — J30.2 SEASONAL ALLERGIC RHINITIS, UNSPECIFIED TRIGGER: Chronic | ICD-10-CM

## 2025-02-12 PROCEDURE — 99214 OFFICE O/P EST MOD 30 MIN: CPT | Performed by: OTOLARYNGOLOGY

## 2025-02-12 RX ORDER — CEFDINIR 300 MG/1
600 CAPSULE ORAL DAILY
Qty: 28 CAPSULE | Refills: 0 | Status: SHIPPED | OUTPATIENT
Start: 2025-02-12 | End: 2025-02-26

## 2025-02-12 NOTE — PROGRESS NOTES
CHIEF COMPLAINT  Chief Complaint   Patient presents with    Sinusitis    Allergic Rhinitis        HISTORY OF PRESENT ILLNESS    Marley Chu is a 24 y.o. female who presented today for evaluation and management for sinusitis.  Patient stated that she has sinus/facial pain.pain in face, cheeks and forehead, nasal pain, otalgia, and post nasal drainage, for about a week.  I always have pressure in my sinuses here and here [forehead and cheeks].  Afrin test helped the headaches \"yeah, I noticed a big difference with it.\"  She has been using it once a month but more frequent the past month.  Had sinus infection diagnosed about two weeks ago and last week.  Treated by Constance Catherine and another NP with no antibiotics or other medications.  A CT scan of the sinuses was ordered for next week.          REVIEW OF SYSTEMS  Constitutional:  Denied fever and chills.  ENT/sinus:  Denied otorrhea, rhinorrhea, and sore throat.       EXAMINATION    Vitals:    02/12/25 0958   BP: 114/78   Site: Left Upper Arm   Position: Sitting   Cuff Size: Medium Adult   Pulse: (!) 106   Temp: 97.5 °F (36.4 °C)   TempSrc: Infrared   SpO2: 99%   Weight: 42.6 kg (94 lb)   Height: 1.499 m (4' 11\")       WDWN, NAD  Face:  Normal skin.    Voice:  Normal, with no hoarseness, breathiness, or hot potato quality.  Ears:   TMs and EACs were normal. The mastoids and pinnae were normal.    (+) Nose:  mild rightward NSD.  Mild bilateral nasal mucosal congestion.  No pus or polyps.  Otherswise normal.    Sinuses: Nontender x 4   Throat,  OC/OP:  Normal.    Neck:  NT, No masses.  Trachea midline.    Nodes:  No lymphadenopathy.       I performed this physical exam personally.        IMPRESSION / DIAGNOSES / ORDERS:   Marley was seen today for sinusitis and allergic rhinitis .    Diagnoses and all orders for this visit:    Acute rhinosinusitis  -     cefdinir (OMNICEF) 300 MG capsule; Take 2 capsules by mouth daily for 14 days Take both capsules together at the

## 2025-02-12 NOTE — PATIENT INSTRUCTIONS
Please read and follow these instructions.  Please call the office and speak to the MA or LPN with any questions regarding these instructions.        RHINOSINUSITIS CARE  You may use acetaminophen (eg. Tylenol) or Ibuprofen (eg. Advil) (over the counter medications) as needed for fever or pain.  Take Probiotic while you are taking antibiotics, to prevent diarrhea, stomach upset, pseudomembranous colitis, and C. difficile diarrhea.  This may be obtained at your pharmacy or health food store.  Alternatively, you may eat one cup of yogurt with active or live cultures twice daily while taking the antibiotic.  Continue for two to three days after completion of the antibiotic.  Use a 12 hour decongestant spray, 0.05% oxymetazoline (e.g. Afrin, Duration, 4-Way).  Spray each nostril twice three times a day for three days, then two times a day for 2 days, and then stop for two days and then repeat the cycle once.  Take one Mucinex-D (red orange box) maximum strength 1200 mg tablet maximum strength tablet (or two 600 mg regular strength tablets) each morning and one Mucinex (blue box) maximum strength 1200 mg tablet maximum strength tablet (or two 600 mg regular strength tablets) each evening, about 12 hours later, daily for the next 14 days.    Use a saline nasal/sinus irrigation system, e.g. NeilMed sinus rinse, twice daily to help to clear secretions and drainage.  Use distilled water; DO NOT USE TAP WATER!  This is because of the possibility of amoeba or other microorganisms in tap water, which can result in a fatal disease.  Alternatively, you could use a blue bulb syringe and solution of 1/4 tsp of table salt in 8 ounces (one cup or 240 ml) of distilled water.    Use fluticasone 2 sprays in each nostril once daily for the next 14 days and then as needed for allergies.  It may take several days to several weeks for your sinusitis to clear up.  It is important to take all your medications as prescribed.  Please continue

## 2025-03-05 ENCOUNTER — HOSPITAL ENCOUNTER (OUTPATIENT)
Dept: CT IMAGING | Age: 25
Discharge: HOME | End: 2025-03-05
Payer: COMMERCIAL

## 2025-03-05 DIAGNOSIS — J32.9 RECURRENT SINUSITIS: ICD-10-CM

## 2025-03-05 PROCEDURE — 70486 CT MAXILLOFACIAL W/O DYE: CPT

## 2025-03-17 ENCOUNTER — OFFICE VISIT (OUTPATIENT)
Dept: INTERNAL MEDICINE CLINIC | Age: 25
End: 2025-03-17

## 2025-03-17 VITALS
HEART RATE: 80 BPM | SYSTOLIC BLOOD PRESSURE: 98 MMHG | DIASTOLIC BLOOD PRESSURE: 70 MMHG | WEIGHT: 93 LBS | OXYGEN SATURATION: 100 % | BODY MASS INDEX: 18.75 KG/M2 | HEIGHT: 59 IN

## 2025-03-17 DIAGNOSIS — H81.13 BPPV (BENIGN PAROXYSMAL POSITIONAL VERTIGO), BILATERAL: ICD-10-CM

## 2025-03-17 DIAGNOSIS — R10.13 EPIGASTRIC PAIN: ICD-10-CM

## 2025-03-17 DIAGNOSIS — R12 HEARTBURN: Primary | ICD-10-CM

## 2025-03-17 DIAGNOSIS — R11.0 NAUSEA: ICD-10-CM

## 2025-03-17 PROBLEM — J06.9 URI, ACUTE: Status: RESOLVED | Noted: 2023-11-10 | Resolved: 2025-03-17

## 2025-03-17 PROBLEM — R52 BODY ACHES: Status: RESOLVED | Noted: 2025-02-04 | Resolved: 2025-03-17

## 2025-03-17 LAB
ALBUMIN SERPL-MCNC: 4.2 G/DL (ref 3.4–5)
ALBUMIN/GLOB SERPL: 1.4 {RATIO} (ref 1.1–2.2)
ALP SERPL-CCNC: 54 U/L (ref 40–129)
ALT SERPL-CCNC: 26 U/L (ref 10–40)
ANION GAP SERPL CALCULATED.3IONS-SCNC: 10 MMOL/L (ref 3–16)
AST SERPL-CCNC: 20 U/L (ref 15–37)
BASOPHILS # BLD: 0 K/UL (ref 0–0.2)
BASOPHILS NFR BLD: 0.7 %
BILIRUB SERPL-MCNC: 0.3 MG/DL (ref 0–1)
BILIRUBIN, POC: NEGATIVE
BLOOD URINE, POC: NEGATIVE
BUN SERPL-MCNC: 16 MG/DL (ref 7–20)
CALCIUM SERPL-MCNC: 9.4 MG/DL (ref 8.3–10.6)
CHLORIDE SERPL-SCNC: 103 MMOL/L (ref 99–110)
CLARITY, POC: CLEAR
CO2 SERPL-SCNC: 26 MMOL/L (ref 21–32)
COLOR, POC: YELLOW
CREAT SERPL-MCNC: 0.7 MG/DL (ref 0.6–1.1)
DEPRECATED RDW RBC AUTO: 13.6 % (ref 12.4–15.4)
EOSINOPHIL # BLD: 0.1 K/UL (ref 0–0.6)
EOSINOPHIL NFR BLD: 2.3 %
GFR SERPLBLD CREATININE-BSD FMLA CKD-EPI: >90 ML/MIN/{1.73_M2}
GLUCOSE SERPL-MCNC: 92 MG/DL (ref 70–99)
GLUCOSE URINE, POC: NEGATIVE MG/DL
HCT VFR BLD AUTO: 38.3 % (ref 36–48)
HGB BLD-MCNC: 12.9 G/DL (ref 12–16)
KETONES, POC: NEGATIVE MG/DL
LEUKOCYTE EST, POC: NEGATIVE
LIPASE SERPL-CCNC: 29 U/L (ref 13–60)
LYMPHOCYTES # BLD: 1.3 K/UL (ref 1–5.1)
LYMPHOCYTES NFR BLD: 31.2 %
MCH RBC QN AUTO: 29 PG (ref 26–34)
MCHC RBC AUTO-ENTMCNC: 33.7 G/DL (ref 31–36)
MCV RBC AUTO: 86 FL (ref 80–100)
MONOCYTES # BLD: 0.3 K/UL (ref 0–1.3)
MONOCYTES NFR BLD: 7.4 %
NEUTROPHILS # BLD: 2.5 K/UL (ref 1.7–7.7)
NEUTROPHILS NFR BLD: 58.4 %
NITRITE, POC: NEGATIVE
PH, POC: 6
PLATELET # BLD AUTO: 270 K/UL (ref 135–450)
PMV BLD AUTO: 7.5 FL (ref 5–10.5)
POTASSIUM SERPL-SCNC: 4.1 MMOL/L (ref 3.5–5.1)
PROT SERPL-MCNC: 7.1 G/DL (ref 6.4–8.2)
PROTEIN, POC: NORMAL MG/DL
RBC # BLD AUTO: 4.46 M/UL (ref 4–5.2)
SODIUM SERPL-SCNC: 139 MMOL/L (ref 136–145)
SPECIFIC GRAVITY, POC: 1.03
UROBILINOGEN, POC: 0.2 MG/DL
WBC # BLD AUTO: 4.2 K/UL (ref 4–11)

## 2025-03-17 RX ORDER — DICYCLOMINE HCL 20 MG
20 TABLET ORAL 4 TIMES DAILY
Qty: 20 TABLET | Refills: 0 | Status: SHIPPED | OUTPATIENT
Start: 2025-03-17

## 2025-03-17 RX ORDER — ONDANSETRON 4 MG/1
4 TABLET, ORALLY DISINTEGRATING ORAL 3 TIMES DAILY PRN
Qty: 21 TABLET | Refills: 0 | Status: SHIPPED | OUTPATIENT
Start: 2025-03-17

## 2025-03-17 RX ORDER — PANTOPRAZOLE SODIUM 20 MG/1
TABLET, DELAYED RELEASE ORAL
Qty: 60 TABLET | Refills: 0 | Status: SHIPPED | OUTPATIENT
Start: 2025-03-17

## 2025-03-17 ASSESSMENT — ENCOUNTER SYMPTOMS
VOMITING: 0
COUGH: 0
CONSTIPATION: 0
TROUBLE SWALLOWING: 1
NAUSEA: 1
DIARRHEA: 0
WHEEZING: 0
SHORTNESS OF BREATH: 0
ABDOMINAL PAIN: 1
RHINORRHEA: 0

## 2025-03-17 NOTE — PROGRESS NOTES
BP Site: Left Upper Arm   Patient Position: Sitting   BP Cuff Size: Medium Adult   Pulse: 80   SpO2: 100%   Weight: 42.2 kg (93 lb)   Height: 1.499 m (4' 11\")      Body mass index is 18.78 kg/m².    Physical Exam  Constitutional:       General: She is not in acute distress.     Appearance: Normal appearance. She is not ill-appearing.   HENT:      Head: Normocephalic and atraumatic.      Right Ear: Tympanic membrane, ear canal and external ear normal.      Left Ear: Tympanic membrane, ear canal and external ear normal.      Nose: Nose normal.      Mouth/Throat:      Mouth: Mucous membranes are moist.      Pharynx: Oropharynx is clear.   Eyes:      Extraocular Movements: Extraocular movements intact.      Conjunctiva/sclera: Conjunctivae normal.      Pupils: Pupils are equal, round, and reactive to light.   Cardiovascular:      Rate and Rhythm: Normal rate and regular rhythm.      Heart sounds: Normal heart sounds.   Pulmonary:      Effort: Pulmonary effort is normal.      Breath sounds: Normal breath sounds.   Abdominal:      General: Bowel sounds are normal.      Palpations: Abdomen is soft.      Tenderness: There is abdominal tenderness (midepigastric).   Neurological:      Mental Status: She is alert and oriented to person, place, and time.   Psychiatric:         Mood and Affect: Mood normal.         Behavior: Behavior normal.     All questions answered. Patient stated no further questions or concerns at this time.     Electronically signed by KULWINDER Rosa NP on 3/17/2025 at 12:38 PM.

## 2025-03-17 NOTE — ASSESSMENT & PLAN NOTE
-  acute, new problem  -  having lightheadedness and dizziness with head movements  -  recommend sudafed and flonase  -  had bppv in the past and participated in vestibular therapy with success.  -  vestibular exercises which see will get from her audiologist.

## 2025-03-17 NOTE — ASSESSMENT & PLAN NOTE
-  acute on chronic problem  -  having breakthrough acid despite omprazole 40 mg daily.  -  change Omeprazole 40 mg to pantoprazole 20 mg bid.  -  Refer to GI  -  she can use gaviscon as needed until seen by GI.

## 2025-03-17 NOTE — ASSESSMENT & PLAN NOTE
-  acute, new problem  -  may be related to ulcer vs heart burn vs gallbladder.  -  POCT urinalysis unremarkable   will check labs-cbc, cmp, lipase  -  change omeprazole 40 mg to pantoprazole 20 mg bid   -  she can use gaviscon as needed for now.  -  refer to GI.

## 2025-03-18 ENCOUNTER — RESULTS FOLLOW-UP (OUTPATIENT)
Dept: INTERNAL MEDICINE CLINIC | Age: 25
End: 2025-03-18

## 2025-03-27 DIAGNOSIS — R10.13 EPIGASTRIC PAIN: ICD-10-CM

## 2025-03-27 DIAGNOSIS — R12 HEARTBURN: ICD-10-CM

## 2025-03-27 RX ORDER — PANTOPRAZOLE SODIUM 20 MG/1
TABLET, DELAYED RELEASE ORAL
Qty: 60 TABLET | Refills: 0 | OUTPATIENT
Start: 2025-03-27

## 2025-03-27 RX ORDER — DICYCLOMINE HCL 20 MG
20 TABLET ORAL 4 TIMES DAILY
Qty: 20 TABLET | Refills: 0 | OUTPATIENT
Start: 2025-03-27

## 2025-03-27 NOTE — TELEPHONE ENCOUNTER
Medication:   Requested Prescriptions     Pending Prescriptions Disp Refills    pantoprazole (PROTONIX) 20 MG tablet 60 tablet 0     Sig: Take one tablet po bid    dicyclomine (BENTYL) 20 MG tablet 20 tablet 0     Sig: Take 1 tablet by mouth 4 times daily        Last Filled:      Patient Phone Number: [phone] (home) [phone] (work)    Last appt: 3/17/2025   Next appt: 4/2/2025    Last OARRS:        No data to display

## 2025-04-21 DIAGNOSIS — R12 HEARTBURN: ICD-10-CM

## 2025-04-21 DIAGNOSIS — R10.13 EPIGASTRIC PAIN: ICD-10-CM

## 2025-04-21 RX ORDER — PANTOPRAZOLE SODIUM 20 MG/1
TABLET, DELAYED RELEASE ORAL
Qty: 60 TABLET | Refills: 0 | Status: SHIPPED | OUTPATIENT
Start: 2025-04-21

## 2025-04-23 ENCOUNTER — TELEPHONE (OUTPATIENT)
Dept: INTERNAL MEDICINE CLINIC | Age: 25
End: 2025-04-23

## 2025-04-24 NOTE — TELEPHONE ENCOUNTER
The medication is APPROVED thru 04/23/28    If this requires a response please respond to the pool ( P [facility] MEDICATION PRE-AUTH).      Thank you please advise patient.

## 2025-04-24 NOTE — TELEPHONE ENCOUNTER
Submitted PA for   pantoprazole (PROTONIX) 20 MG tablet  Via [facility] (Key: [id number]) STATUS: PENDING.    Follow up done daily; if no decision with in three days we will refax.  If another three days goes by with no decision will call the insurance for status.

## 2025-05-12 ENCOUNTER — OFFICE VISIT (OUTPATIENT)
Dept: ENT CLINIC | Age: 25
End: 2025-05-12
Payer: COMMERCIAL

## 2025-05-12 VITALS
TEMPERATURE: 98.1 F | WEIGHT: 96 LBS | HEIGHT: 59 IN | DIASTOLIC BLOOD PRESSURE: 73 MMHG | BODY MASS INDEX: 19.35 KG/M2 | HEART RATE: 101 BPM | SYSTOLIC BLOOD PRESSURE: 108 MMHG | OXYGEN SATURATION: 98 %

## 2025-05-12 DIAGNOSIS — R42 VERTIGO: Primary | ICD-10-CM

## 2025-05-12 DIAGNOSIS — J34.2 DEVIATED NASAL SEPTUM: ICD-10-CM

## 2025-05-12 DIAGNOSIS — J31.0 CHRONIC RHINITIS: ICD-10-CM

## 2025-05-12 DIAGNOSIS — J34.3 HYPERTROPHY OF BOTH INFERIOR NASAL TURBINATES: ICD-10-CM

## 2025-05-12 DIAGNOSIS — J01.41 ACUTE RECURRENT PANSINUSITIS: ICD-10-CM

## 2025-05-12 DIAGNOSIS — J34.89 NASAL OBSTRUCTION: ICD-10-CM

## 2025-05-12 PROCEDURE — 99214 OFFICE O/P EST MOD 30 MIN: CPT | Performed by: STUDENT IN AN ORGANIZED HEALTH CARE EDUCATION/TRAINING PROGRAM

## 2025-05-12 PROCEDURE — 31231 NASAL ENDOSCOPY DX: CPT | Performed by: STUDENT IN AN ORGANIZED HEALTH CARE EDUCATION/TRAINING PROGRAM

## 2025-05-12 RX ORDER — AZELASTINE HYDROCHLORIDE, FLUTICASONE PROPIONATE 137; 50 UG/1; UG/1
1 SPRAY, METERED NASAL 2 TIMES DAILY
Qty: 1 EACH | Refills: 5 | Status: SHIPPED | OUTPATIENT
Start: 2025-05-12

## 2025-05-12 RX ORDER — AZELASTINE HYDROCHLORIDE, FLUTICASONE PROPIONATE 137; 50 UG/1; UG/1
SPRAY, METERED NASAL
Qty: 69 G | OUTPATIENT
Start: 2025-05-12

## 2025-05-12 NOTE — PROGRESS NOTES
[facility]  DIVISION OF OTOLARYNGOLOGY- HEAD & NECK SURGERY  CONSULT      Marley Chu (:  2000) is a 24 y.o. female, here for evaluation of the following chief complaint(s):  Sinus Problem (Congestion and post nasal drip/Sinus drainage is green)      ASSESSMENT/PLAN:  1. Vertigo  -     PT vestibular rehab; Future  2. Acute recurrent pansinusitis  3. Hypertrophy of both inferior nasal turbinates  4. Nasal obstruction  5. Deviated nasal septum  6. Chronic rhinitis         This is a very pleasant 24 y.o. female here today for evaluation of the the above-noted complaints.      Assessment & Plan  - I independently reviewed interpret the patient's CT scan ordered by another provider.  It shows evidence of rightward deviated nasal septum, inferior turbinate hypertrophy and clear paranasal sinuses.  - The patient is suffering from recurrent acute sinusitis that I feel is being exacerbated by allergies.  - Begin Dymista  - Begin saline irrigations  - Begin immunotherapy.  I am hopeful that this will reduce the frequency and severity of some of her symptoms  - Follow-up in 3 to 4 months.  If no improvement, discussed nasal airway surgery and possible balloon dilation of the sinuses.  - Vertigo of unclear etiology.  [place]-Hallpike negative on exam today.  Recommend physical therapy if symptoms return.    Medical Decision Making:  The following items were considered in medical decision making:  Independent review of images  Review / order clinical lab tests  Review / order radiology tests  Decision to obtain old records  [facility] and [facility]    SUBJECTIVE/OBJECTIVE:  HPI    History of Present Illness    Patient presents today as a follow-up for recurrent acute sinusitis.  Was seen by my former partner who has since retired.  She reports getting 4 more episodes of acute sinusitis per year.  She always requires antibiotics to improve.  Symptoms include

## 2025-06-02 DIAGNOSIS — J30.2 SEASONAL ALLERGIC RHINITIS, UNSPECIFIED TRIGGER: ICD-10-CM

## 2025-06-02 RX ORDER — MONTELUKAST SODIUM 10 MG/1
10 TABLET ORAL DAILY
Qty: 90 TABLET | Refills: 1 | Status: SHIPPED | OUTPATIENT
Start: 2025-06-02

## 2025-06-03 DIAGNOSIS — R10.13 EPIGASTRIC PAIN: ICD-10-CM

## 2025-06-03 DIAGNOSIS — R12 HEARTBURN: ICD-10-CM

## 2025-06-03 RX ORDER — PANTOPRAZOLE SODIUM 20 MG/1
20 TABLET, DELAYED RELEASE ORAL 2 TIMES DAILY
Qty: 60 TABLET | Refills: 0 | Status: SHIPPED | OUTPATIENT
Start: 2025-06-03

## 2025-06-09 DIAGNOSIS — R10.13 EPIGASTRIC PAIN: ICD-10-CM

## 2025-06-09 DIAGNOSIS — R12 HEARTBURN: ICD-10-CM

## 2025-06-09 RX ORDER — PANTOPRAZOLE SODIUM 20 MG/1
20 TABLET, DELAYED RELEASE ORAL 2 TIMES DAILY
Qty: 60 TABLET | Refills: 0 | OUTPATIENT
Start: 2025-06-09

## 2025-07-08 DIAGNOSIS — R12 HEARTBURN: ICD-10-CM

## 2025-07-08 DIAGNOSIS — R10.13 EPIGASTRIC PAIN: ICD-10-CM

## 2025-07-08 RX ORDER — PANTOPRAZOLE SODIUM 20 MG/1
20 TABLET, DELAYED RELEASE ORAL 2 TIMES DAILY
Qty: 60 TABLET | Refills: 0 | Status: SHIPPED | OUTPATIENT
Start: 2025-07-08

## 2025-07-08 RX ORDER — PANTOPRAZOLE SODIUM 20 MG/1
20 TABLET, DELAYED RELEASE ORAL 2 TIMES DAILY
Qty: 180 TABLET | OUTPATIENT
Start: 2025-07-08

## 2025-07-08 NOTE — TELEPHONE ENCOUNTER
Last OV: 3/17/2025  Next OV: Visit date not found    Next appointment due:around 3/31/2025     Last fill:6/3/25  Refills:0    Scheduling ticket sent 7/8/25

## 2025-08-08 DIAGNOSIS — R10.13 EPIGASTRIC PAIN: ICD-10-CM

## 2025-08-08 DIAGNOSIS — R12 HEARTBURN: ICD-10-CM

## 2025-08-11 RX ORDER — PANTOPRAZOLE SODIUM 20 MG/1
20 TABLET, DELAYED RELEASE ORAL 2 TIMES DAILY
Qty: 180 TABLET | Refills: 1 | Status: SHIPPED | OUTPATIENT
Start: 2025-08-11